# Patient Record
Sex: FEMALE | Race: WHITE | Employment: OTHER | URBAN - METROPOLITAN AREA
[De-identification: names, ages, dates, MRNs, and addresses within clinical notes are randomized per-mention and may not be internally consistent; named-entity substitution may affect disease eponyms.]

---

## 2024-11-05 ENCOUNTER — APPOINTMENT (OUTPATIENT)
Dept: CT IMAGING | Age: 74
DRG: 072 | End: 2024-11-05
Payer: MEDICARE

## 2024-11-05 ENCOUNTER — HOSPITAL ENCOUNTER (INPATIENT)
Age: 74
LOS: 2 days | Discharge: HOME | DRG: 072 | End: 2024-11-09
Attending: EMERGENCY MEDICINE | Admitting: INTERNAL MEDICINE
Payer: MEDICARE

## 2024-11-05 ENCOUNTER — APPOINTMENT (OUTPATIENT)
Dept: ULTRASOUND IMAGING | Age: 74
DRG: 072 | End: 2024-11-05
Payer: MEDICARE

## 2024-11-05 ENCOUNTER — APPOINTMENT (OUTPATIENT)
Dept: GENERAL RADIOLOGY | Age: 74
DRG: 072 | End: 2024-11-05
Payer: MEDICARE

## 2024-11-05 DIAGNOSIS — N30.00 ACUTE CYSTITIS WITHOUT HEMATURIA: ICD-10-CM

## 2024-11-05 DIAGNOSIS — R60.0 LOCALIZED EDEMA: ICD-10-CM

## 2024-11-05 DIAGNOSIS — Z86.59 H/O PSYCHIATRIC HOSPITALIZATION: ICD-10-CM

## 2024-11-05 DIAGNOSIS — I10 HYPERTENSION, UNSPECIFIED TYPE: Primary | ICD-10-CM

## 2024-11-05 DIAGNOSIS — R60.0 PERIPHERAL EDEMA: ICD-10-CM

## 2024-11-05 LAB
ALBUMIN SERPL-MCNC: 4.2 G/DL (ref 3.5–4.6)
ALP SERPL-CCNC: 93 U/L (ref 40–130)
ALT SERPL-CCNC: 15 U/L (ref 0–33)
AMMONIA PLAS-SCNC: 21 UMOL/L (ref 11–51)
AMPHET UR QL SCN: NORMAL
ANION GAP SERPL CALCULATED.3IONS-SCNC: 12 MEQ/L (ref 9–15)
APAP SERPL-MCNC: <5 UG/ML (ref 10–30)
APTT PPP: 24.9 SEC (ref 24.4–36.8)
AST SERPL-CCNC: 39 U/L (ref 0–35)
BACTERIA URNS QL MICRO: NEGATIVE /HPF
BARBITURATES UR QL SCN: NORMAL
BASOPHILS # BLD: 0 K/UL (ref 0–0.2)
BASOPHILS NFR BLD: 0.4 %
BENZODIAZ UR QL SCN: NORMAL
BILIRUB SERPL-MCNC: 0.7 MG/DL (ref 0.2–0.7)
BILIRUB UR QL STRIP: NEGATIVE
BNP BLD-MCNC: 110 PG/ML
BUN SERPL-MCNC: 33 MG/DL (ref 8–23)
CALCIUM SERPL-MCNC: 9.5 MG/DL (ref 8.5–9.9)
CANNABINOIDS UR QL SCN: NORMAL
CHLORIDE SERPL-SCNC: 100 MEQ/L (ref 95–107)
CHOLEST SERPL-MCNC: 184 MG/DL (ref 0–199)
CK SERPL-CCNC: 798 U/L (ref 0–170)
CLARITY UR: CLEAR
CO2 SERPL-SCNC: 27 MEQ/L (ref 20–31)
COCAINE UR QL SCN: NORMAL
COLOR UR: YELLOW
CREAT SERPL-MCNC: 0.72 MG/DL (ref 0.5–0.9)
DRUG SCREEN COMMENT UR-IMP: NORMAL
EOSINOPHIL # BLD: 0.1 K/UL (ref 0–0.7)
EOSINOPHIL NFR BLD: 0.6 %
EPI CELLS #/AREA URNS AUTO: ABNORMAL /HPF (ref 0–5)
ERYTHROCYTE [DISTWIDTH] IN BLOOD BY AUTOMATED COUNT: 12.8 % (ref 11.5–14.5)
ETHANOL PERCENT: NORMAL G/DL
ETHANOLAMINE SERPL-MCNC: <10 MG/DL (ref 0–0.08)
FENTANYL SCREEN, URINE: NORMAL
GLOBULIN SER CALC-MCNC: 3.2 G/DL (ref 2.3–3.5)
GLUCOSE SERPL-MCNC: 158 MG/DL (ref 70–99)
GLUCOSE UR STRIP-MCNC: NEGATIVE MG/DL
HCT VFR BLD AUTO: 45.8 % (ref 37–47)
HDLC SERPL-MCNC: 74 MG/DL (ref 40–59)
HGB BLD-MCNC: 15.4 G/DL (ref 12–16)
HGB UR QL STRIP: ABNORMAL
HYALINE CASTS #/AREA URNS AUTO: ABNORMAL /HPF (ref 0–5)
INR PPP: 1.1
KETONES UR STRIP-MCNC: ABNORMAL MG/DL
LDLC SERPL CALC-MCNC: 95 MG/DL (ref 0–129)
LEUKOCYTE ESTERASE UR QL STRIP: ABNORMAL
LYMPHOCYTES # BLD: 1.2 K/UL (ref 1–4.8)
LYMPHOCYTES NFR BLD: 14.5 %
MCH RBC QN AUTO: 33.3 PG (ref 27–31.3)
MCHC RBC AUTO-ENTMCNC: 33.6 % (ref 33–37)
MCV RBC AUTO: 98.9 FL (ref 79.4–94.8)
METHADONE UR QL SCN: NORMAL
MONOCYTES # BLD: 0.8 K/UL (ref 0.2–0.8)
MONOCYTES NFR BLD: 9.5 %
NEUTROPHILS # BLD: 6.3 K/UL (ref 1.4–6.5)
NEUTS SEG NFR BLD: 74.8 %
NITRITE UR QL STRIP: NEGATIVE
OPIATES UR QL SCN: NORMAL
OXYCODONE UR QL SCN: NORMAL
PCP UR QL SCN: NORMAL
PH UR STRIP: 5.5 [PH] (ref 5–9)
PLATELET # BLD AUTO: 266 K/UL (ref 130–400)
POTASSIUM SERPL-SCNC: 4.1 MEQ/L (ref 3.4–4.9)
PROPOXYPH UR QL SCN: NORMAL
PROT SERPL-MCNC: 7.4 G/DL (ref 6.3–8)
PROT UR STRIP-MCNC: ABNORMAL MG/DL
PROTHROMBIN TIME: 14.2 SEC (ref 12.3–14.9)
RBC # BLD AUTO: 4.63 M/UL (ref 4.2–5.4)
RBC #/AREA URNS AUTO: ABNORMAL /HPF (ref 0–5)
SALICYLATES SERPL-MCNC: <0.3 MG/DL (ref 15–30)
SARS-COV-2 RDRP RESP QL NAA+PROBE: NOT DETECTED
SODIUM SERPL-SCNC: 139 MEQ/L (ref 135–144)
SP GR UR STRIP: 1.03 (ref 1–1.03)
TRIGL SERPL-MCNC: 77 MG/DL (ref 0–150)
TSH REFLEX: 2.29 UIU/ML (ref 0.44–3.86)
URINE REFLEX TO CULTURE: YES
UROBILINOGEN UR STRIP-ACNC: 0.2 E.U./DL
WBC # BLD AUTO: 8.4 K/UL (ref 4.8–10.8)
WBC #/AREA URNS AUTO: >100 /HPF (ref 0–5)

## 2024-11-05 PROCEDURE — 85610 PROTHROMBIN TIME: CPT

## 2024-11-05 PROCEDURE — 80179 DRUG ASSAY SALICYLATE: CPT

## 2024-11-05 PROCEDURE — 80061 LIPID PANEL: CPT

## 2024-11-05 PROCEDURE — 82140 ASSAY OF AMMONIA: CPT

## 2024-11-05 PROCEDURE — 81001 URINALYSIS AUTO W/SCOPE: CPT

## 2024-11-05 PROCEDURE — 84443 ASSAY THYROID STIM HORMONE: CPT

## 2024-11-05 PROCEDURE — 71045 X-RAY EXAM CHEST 1 VIEW: CPT

## 2024-11-05 PROCEDURE — 96375 TX/PRO/DX INJ NEW DRUG ADDON: CPT

## 2024-11-05 PROCEDURE — 87086 URINE CULTURE/COLONY COUNT: CPT

## 2024-11-05 PROCEDURE — 85730 THROMBOPLASTIN TIME PARTIAL: CPT

## 2024-11-05 PROCEDURE — 96376 TX/PRO/DX INJ SAME DRUG ADON: CPT

## 2024-11-05 PROCEDURE — 82077 ASSAY SPEC XCP UR&BREATH IA: CPT

## 2024-11-05 PROCEDURE — 85025 COMPLETE CBC W/AUTO DIFF WBC: CPT

## 2024-11-05 PROCEDURE — 99285 EMERGENCY DEPT VISIT HI MDM: CPT

## 2024-11-05 PROCEDURE — 82550 ASSAY OF CK (CPK): CPT

## 2024-11-05 PROCEDURE — 87635 SARS-COV-2 COVID-19 AMP PRB: CPT

## 2024-11-05 PROCEDURE — 80053 COMPREHEN METABOLIC PANEL: CPT

## 2024-11-05 PROCEDURE — 93005 ELECTROCARDIOGRAM TRACING: CPT | Performed by: EMERGENCY MEDICINE

## 2024-11-05 PROCEDURE — 93970 EXTREMITY STUDY: CPT

## 2024-11-05 PROCEDURE — 83880 ASSAY OF NATRIURETIC PEPTIDE: CPT

## 2024-11-05 PROCEDURE — 2580000003 HC RX 258: Performed by: EMERGENCY MEDICINE

## 2024-11-05 PROCEDURE — 6360000002 HC RX W HCPCS: Performed by: EMERGENCY MEDICINE

## 2024-11-05 PROCEDURE — 96365 THER/PROPH/DIAG IV INF INIT: CPT

## 2024-11-05 PROCEDURE — 70450 CT HEAD/BRAIN W/O DYE: CPT

## 2024-11-05 PROCEDURE — 80143 DRUG ASSAY ACETAMINOPHEN: CPT

## 2024-11-05 PROCEDURE — 80307 DRUG TEST PRSMV CHEM ANLYZR: CPT

## 2024-11-05 RX ORDER — LABETALOL HYDROCHLORIDE 5 MG/ML
10 INJECTION, SOLUTION INTRAVENOUS ONCE
Status: COMPLETED | OUTPATIENT
Start: 2024-11-05 | End: 2024-11-05

## 2024-11-05 RX ORDER — CEPHALEXIN 500 MG/1
500 CAPSULE ORAL EVERY 8 HOURS SCHEDULED
Status: DISCONTINUED | OUTPATIENT
Start: 2024-11-06 | End: 2024-11-06 | Stop reason: CLARIF

## 2024-11-05 RX ORDER — LABETALOL HYDROCHLORIDE 5 MG/ML
20 INJECTION, SOLUTION INTRAVENOUS ONCE
Status: COMPLETED | OUTPATIENT
Start: 2024-11-05 | End: 2024-11-05

## 2024-11-05 RX ADMIN — LABETALOL HYDROCHLORIDE 20 MG: 5 INJECTION, SOLUTION INTRAVENOUS at 23:23

## 2024-11-05 RX ADMIN — CEFTRIAXONE SODIUM 1000 MG: 1 INJECTION, POWDER, FOR SOLUTION INTRAMUSCULAR; INTRAVENOUS at 23:22

## 2024-11-05 RX ADMIN — LABETALOL HYDROCHLORIDE 10 MG: 5 INJECTION, SOLUTION INTRAVENOUS at 22:35

## 2024-11-05 ASSESSMENT — LIFESTYLE VARIABLES
HOW MANY STANDARD DRINKS CONTAINING ALCOHOL DO YOU HAVE ON A TYPICAL DAY: PATIENT DOES NOT DRINK
HOW OFTEN DO YOU HAVE A DRINK CONTAINING ALCOHOL: NEVER

## 2024-11-05 ASSESSMENT — ENCOUNTER SYMPTOMS
COUGH: 0
ABDOMINAL PAIN: 0
SHORTNESS OF BREATH: 0
VOMITING: 0

## 2024-11-05 ASSESSMENT — PAIN - FUNCTIONAL ASSESSMENT: PAIN_FUNCTIONAL_ASSESSMENT: NONE - DENIES PAIN

## 2024-11-06 PROBLEM — R41.82 AMS (ALTERED MENTAL STATUS): Status: ACTIVE | Noted: 2024-11-06

## 2024-11-06 PROCEDURE — G0378 HOSPITAL OBSERVATION PER HR: HCPCS

## 2024-11-06 PROCEDURE — 6360000002 HC RX W HCPCS: Performed by: EMERGENCY MEDICINE

## 2024-11-06 PROCEDURE — 96372 THER/PROPH/DIAG INJ SC/IM: CPT

## 2024-11-06 PROCEDURE — 90792 PSYCH DIAG EVAL W/MED SRVCS: CPT | Performed by: PSYCHIATRY & NEUROLOGY

## 2024-11-06 PROCEDURE — 6360000002 HC RX W HCPCS: Performed by: INTERNAL MEDICINE

## 2024-11-06 PROCEDURE — 2580000003 HC RX 258: Performed by: INTERNAL MEDICINE

## 2024-11-06 PROCEDURE — 6370000000 HC RX 637 (ALT 250 FOR IP): Performed by: PSYCHIATRY & NEUROLOGY

## 2024-11-06 PROCEDURE — 96375 TX/PRO/DX INJ NEW DRUG ADDON: CPT

## 2024-11-06 PROCEDURE — 96376 TX/PRO/DX INJ SAME DRUG ADON: CPT

## 2024-11-06 PROCEDURE — 6370000000 HC RX 637 (ALT 250 FOR IP): Performed by: EMERGENCY MEDICINE

## 2024-11-06 RX ORDER — ONDANSETRON 2 MG/ML
4 INJECTION INTRAMUSCULAR; INTRAVENOUS EVERY 6 HOURS PRN
Status: DISCONTINUED | OUTPATIENT
Start: 2024-11-06 | End: 2024-11-09 | Stop reason: HOSPADM

## 2024-11-06 RX ORDER — RISPERIDONE 1 MG/1
TABLET ORAL
Status: ON HOLD | COMMUNITY
End: 2024-11-08 | Stop reason: HOSPADM

## 2024-11-06 RX ORDER — CEPHALEXIN 500 MG/1
500 CAPSULE ORAL 3 TIMES DAILY
Qty: 21 CAPSULE | Refills: 0 | Status: SHIPPED | OUTPATIENT
Start: 2024-11-06 | End: 2024-11-06

## 2024-11-06 RX ORDER — SODIUM CHLORIDE 0.9 % (FLUSH) 0.9 %
5-40 SYRINGE (ML) INJECTION EVERY 12 HOURS SCHEDULED
Status: DISCONTINUED | OUTPATIENT
Start: 2024-11-06 | End: 2024-11-09 | Stop reason: HOSPADM

## 2024-11-06 RX ORDER — ENOXAPARIN SODIUM 100 MG/ML
40 INJECTION SUBCUTANEOUS DAILY
Status: DISCONTINUED | OUTPATIENT
Start: 2024-11-06 | End: 2024-11-09 | Stop reason: HOSPADM

## 2024-11-06 RX ORDER — MAGNESIUM SULFATE IN WATER 40 MG/ML
2000 INJECTION, SOLUTION INTRAVENOUS PRN
Status: DISCONTINUED | OUTPATIENT
Start: 2024-11-06 | End: 2024-11-09 | Stop reason: HOSPADM

## 2024-11-06 RX ORDER — SODIUM CHLORIDE 9 MG/ML
INJECTION, SOLUTION INTRAVENOUS PRN
Status: DISCONTINUED | OUTPATIENT
Start: 2024-11-06 | End: 2024-11-09 | Stop reason: HOSPADM

## 2024-11-06 RX ORDER — POTASSIUM CHLORIDE 1500 MG/1
40 TABLET, EXTENDED RELEASE ORAL PRN
Status: DISCONTINUED | OUTPATIENT
Start: 2024-11-06 | End: 2024-11-09 | Stop reason: HOSPADM

## 2024-11-06 RX ORDER — CEPHALEXIN 500 MG/1
500 CAPSULE ORAL 3 TIMES DAILY
Qty: 21 CAPSULE | Refills: 0 | Status: SHIPPED | OUTPATIENT
Start: 2024-11-06 | End: 2024-11-08 | Stop reason: HOSPADM

## 2024-11-06 RX ORDER — POLYETHYLENE GLYCOL 3350 17 G/17G
17 POWDER, FOR SOLUTION ORAL DAILY PRN
Status: DISCONTINUED | OUTPATIENT
Start: 2024-11-06 | End: 2024-11-09 | Stop reason: HOSPADM

## 2024-11-06 RX ORDER — SODIUM CHLORIDE 0.9 % (FLUSH) 0.9 %
5-40 SYRINGE (ML) INJECTION PRN
Status: DISCONTINUED | OUTPATIENT
Start: 2024-11-06 | End: 2024-11-09 | Stop reason: HOSPADM

## 2024-11-06 RX ORDER — ONDANSETRON 4 MG/1
4 TABLET, ORALLY DISINTEGRATING ORAL EVERY 8 HOURS PRN
Status: DISCONTINUED | OUTPATIENT
Start: 2024-11-06 | End: 2024-11-09 | Stop reason: HOSPADM

## 2024-11-06 RX ORDER — POTASSIUM CHLORIDE 7.45 MG/ML
10 INJECTION INTRAVENOUS PRN
Status: DISCONTINUED | OUTPATIENT
Start: 2024-11-06 | End: 2024-11-09 | Stop reason: HOSPADM

## 2024-11-06 RX ORDER — ACETAMINOPHEN 325 MG/1
650 TABLET ORAL EVERY 6 HOURS PRN
Status: DISCONTINUED | OUTPATIENT
Start: 2024-11-06 | End: 2024-11-09 | Stop reason: HOSPADM

## 2024-11-06 RX ORDER — ACETAMINOPHEN 650 MG/1
650 SUPPOSITORY RECTAL EVERY 6 HOURS PRN
Status: DISCONTINUED | OUTPATIENT
Start: 2024-11-06 | End: 2024-11-09 | Stop reason: HOSPADM

## 2024-11-06 RX ORDER — HYDRALAZINE HYDROCHLORIDE 20 MG/ML
5 INJECTION INTRAMUSCULAR; INTRAVENOUS ONCE
Status: COMPLETED | OUTPATIENT
Start: 2024-11-06 | End: 2024-11-06

## 2024-11-06 RX ORDER — ARIPIPRAZOLE 2 MG/1
2 TABLET ORAL DAILY
Status: DISCONTINUED | OUTPATIENT
Start: 2024-11-06 | End: 2024-11-07

## 2024-11-06 RX ADMIN — ARIPIPRAZOLE 2 MG: 2 TABLET ORAL at 21:53

## 2024-11-06 RX ADMIN — CEPHALEXIN 500 MG: 500 CAPSULE ORAL at 06:54

## 2024-11-06 RX ADMIN — ENOXAPARIN SODIUM 40 MG: 100 INJECTION SUBCUTANEOUS at 10:50

## 2024-11-06 RX ADMIN — HYDRALAZINE HYDROCHLORIDE 5 MG: 20 INJECTION INTRAMUSCULAR; INTRAVENOUS at 00:35

## 2024-11-06 RX ADMIN — SODIUM CHLORIDE, PRESERVATIVE FREE 10 ML: 5 INJECTION INTRAVENOUS at 12:06

## 2024-11-06 RX ADMIN — HYDRALAZINE HYDROCHLORIDE 5 MG: 20 INJECTION INTRAMUSCULAR; INTRAVENOUS at 00:56

## 2024-11-06 NOTE — ED PROVIDER NOTES
[facility] ED  EMERGENCY DEPARTMENT ENCOUNTER      Pt Name: Penny Katz  MRN: [id number]  Birthdate 1950  Date of evaluation: 11/5/2024  Provider: Brian Rader MD  7:51 PM EST    CHIEF COMPLAINT       Chief Complaint   Patient presents with    Leg Swelling     Patient has extreme swelling in BLE. Patient also CO increased confusion. Denies pain/SOB         HISTORY OF PRESENT ILLNESS   (Location/Symptom, Timing/Onset, Context/Setting, Quality, Duration, Modifying Factors, Severity)  Note limiting factors.   Penny Katz is a 74 y.o. female who presents to the emergency department via private vehicle.  Initial history comes from the patient's son at bedside.  He states that years ago the patient took herself off of her blood pressure medication, her water pill and her psychiatric medication.  That 1 month ago she began to have lower extremity swelling.  That for 3 days she has seemed confused.  He says that right now she is at baseline.  But at home she says where things such as \" leave the door open, leave the door open, leave the door open\" and will not be able to explain to him why she wants the door open.  That she will say that her son is dead and believe that he is dead even though he is standing in front of her.  She is awake and alert, denies fever, headache or double vision, neck or jaw pain, chest pain or difficulty breathing, vomiting, abdominal pain, diarrhea, focal numbness or weakness.    HPI  Chart review notes history of prescription for venlafaxine/Risperdal secondary to depression, iron deficiency anemia, chronic low back pain  Nursing Notes were reviewed.    REVIEW OF SYSTEMS    (2-9 systems for level 4, 10 or more for level 5)     Review of Systems   Constitutional:  Negative for fever.   Eyes:  Negative for visual disturbance.   Respiratory:  Negative for cough and shortness of breath.    Cardiovascular:  Positive for leg swelling.   Gastrointestinal:  Negative for abdominal  components:    WBC, UA >100 (*)     RBC, UA 6-10 (*)     All other components within normal limits   COVID-19, RAPID   CULTURE, URINE   BRAIN NATRIURETIC PEPTIDE   APTT   PROTIME-INR   AMMONIA   ETHANOL   URINE DRUG SCREEN   TSH WITH REFLEX   HEMOGLOBIN A1C       All other labs were within normal range or not returned as of this dictation.    EMERGENCY DEPARTMENT COURSE and DIFFERENTIAL DIAGNOSIS/MDM:   Vitals:    Vitals:    11/06/24 0030 11/06/24 0045 11/06/24 0103 11/06/24 0116   BP: (!) 182/81 (!) 164/79 (!) 152/74 (!) 151/69   Pulse: 71 77 82    Resp: 21 19 24    Temp:       TempSrc:       SpO2: 99% 97% 96%    Weight:       Height:           Possible uti, but no bacteruria-I will treat anyway  No leukocytosis or significant electrolyte derangement  Medical Decision Making  Amount and/or Complexity of Data Reviewed  Labs: ordered.  Radiology: ordered.  ECG/medicine tests: ordered.    Risk  Prescription drug management.    Patient presents emergency department for evaluation of lower extremity edema.  Her son has concerns about things that she says at home.  Probable psychiatric etiology. Prev on risperdal but noncompliant. History and physical not highly suspicious of CVA, meningitis/cephalitis.  Afebrile.  Her plain film and BNP is not consistent with CHF.  Probable peripheral edema, venous insufficiency.  No ICH, DVT ruled out  Given labetalol  Her son states that she was talking about EMS coming into the house (they did not) something about the garage (they do not have one), I think she would benefit from psychiatric care.  She does have a psychiatric history.   She requires psych assessment. Will start keflex as well  Psychiatry believes she has delirium and requires medical admission and they will consult, they do not feel comfortable admitting her primarily  Dr mtz accepts      CONSULTS:  None    PROCEDURES:  Unless otherwise noted below, none     Procedures      FINAL IMPRESSION      1. Hypertension,

## 2024-11-06 NOTE — PROGRESS NOTES
Spiritual Health History and Assessment/Progress Note  [facility]    Interdisciplinary rounds,  ,  ,      Name: Penny Katz MRN: [id number]    Age: 74 y.o.     Sex: female   Language: English   Baptist: Amish   AMS (altered mental status)     Date: 11/6/2024            Total Time Calculated: 15 min              Spiritual Assessment began in [facility] 2W ORTHO TELE        Referral/Consult From: Rounding   Encounter Overview/Reason: Interdisciplinary rounds  Service Provided For: Patient    Patient upright alert in  bed. Patient able to follow and have conversation but has clear disconnect at times. Patient expressed she lives with child and is Amish. Patient aware of swelling in legs and said she used to take a water pill. Patient states she would like to go home.      Jordana, Belief, Meaning:   Patient has beliefs or practices that help with coping during difficult times  Family/Friends No family/friends present      Importance and Influence:  Patient has spiritual/personal beliefs that influence decisions regarding their health  Family/Friends No family/friends present    Community:  Patient feels well-supported. Support system includes: Children  Family/Friends No family/friends present    Assessment and Plan of Care:     Patient Interventions include: Facilitated expression of thoughts and feelings and Affirmed coping skills/support systems  Family/Friends Interventions include: No family/friends present    Patient Plan of Care: Spiritual Care available upon further referral  Family/Friends Plan of Care: No family/friends present    Electronically signed by Nhan Desailain Intern on 11/6/2024 at 4:16 PM

## 2024-11-06 NOTE — PROGRESS NOTES
Problem: Falls - Risk of  Goal: *Absence of Falls  Description: Document Kyra Citlalys Fall Risk and appropriate interventions in the flowsheet.   Outcome: Progressing Towards Goal  Note: Fall Risk Interventions:  Mobility Interventions: OT consult for ADLs, Patient to call before getting OOB, PT Consult for mobility concerns, PT Consult for assist device competence, Strengthening exercises (ROM-active/passive), Utilize walker, cane, or other assistive device         Medication Interventions: Assess postural VS orthostatic hypotension, Patient to call before getting OOB, Teach patient to arise slowly    Elimination Interventions: Call light in reach, Patient to call for help with toileting needs, Toileting schedule/hourly rounds Report received from OSMANY Estrada RN in [facility] at this time

## 2024-11-06 NOTE — ED NOTES
Provisional Diagnosis:   Psych Evaluation, confusion    Psychosocial and Contextual Factors:      This 74 year old  female presents to the ED for leg swelling and confusion. She lives with her son and 3 pet cats. She has 2 other adult sons. Per ED patient's son reports she took herself off her blood pressure medication, water pill and psych medication. She has no psychiatrist or therapist. She reports occasional alcohol use. She denies any substance abuse. Denies any violence or legal issues. The family has transportation.       C-SSRS Summary:    1) Within the past month, have you wished you were dead or wished you could go to sleep and not wake up? : No  2) Have you actually had any thoughts of killing yourself? : No  6) Have you ever done anything, started to do anything, or prepared to do anything to end your life?: No    Patient: Calm and cooperative.    Family:  None at bedside.    Agency: None    Substance Abuse:   Pt denies substance abuse.  Pt reports occasional alcohol use. Tox screens negative.    Present Suicidal Behavior:      Verbal: Denies    Attempt:Denies    Past Suicidal Behavior:     Verbal:Denies    Attempt:Denies    Self-Injurious/Self-Mutilation:Denies    Violence Current or Past: Denies      Trauma Identified:    Physical abuse: Denies  Verbal abuse: Denies  Emotional abuse: Denies  Financial abuse: Denies  Sexual abuse: Denies    Protective Factors:    Supportive son.  Housing    Risk Factors:    Non compliance with medications  No psych services.    Clinical Summary:    74 year old female presents to ED with BLE swelling and increased confusion. Pt pleasant and cooperative. Patient tearful reports recent losses of family and friends. She reports not taking prescribed medications. Per ED note; while at home pt will say her son is dead and believe he is dead even though he is standing in front of her. Pt denies SI, HI, and AVH. Patient reports feeling sad. She endorses adequate appetite

## 2024-11-06 NOTE — ACP (ADVANCE CARE PLANNING)
Advance Care Planning   Healthcare Decision Maker:    Primary Decision Maker: Mac Ruelas - Child - [phone]    Secondary Decision Maker: Joanne Portillo - Daughter-in-Law - [phone]    Click here to complete Healthcare Decision Makers including selection of the Healthcare Decision Maker Relationship (ie \"Primary\").

## 2024-11-06 NOTE — H&P
History and Physical    Admit Date: 11/5/2024  PCP: No primary care provider on file.    CHIEF COMPLAINT:    Chief Complaint   Patient presents with    Leg Swelling     Patient has extreme swelling in BLE. Patient also CO increased confusion. Denies pain/SOB        HISTORY OF PRESENT ILLNESS:    The patient is a 74 y.o. female with a past medical history of hypertension, depression, obesity who presented with lower extremity the edema and confusion.  Patient was increasingly more confused yesterday.  Patient is a poor historian due to some confusion however she is oriented x 3.  Apparently per chart review, patient took herself off her medication including blood pressure medications and diuretics.  She denies history of CHF but has lower extremity edema.  She reports she had it for very long period of time.  She does not remember how long but over several months.  No chest pain or chest evidence.  No fever or chills.  No orthopnea.    Past Medical History:    Obesity  Hypertension  Depression  Psychiatric problems    Past Surgical History:    Noncontributory, reviewed    Social History:   Social History     Socioeconomic History    Marital status:      Spouse name: Not on file    Number of children: Not on file    Years of education: Not on file    Highest education level: Not on file   Occupational History    Not on file   Tobacco Use    Smoking status: Never    Smokeless tobacco: Not on file   Substance and Sexual Activity    Alcohol use: Not on file    Drug use: Not on file    Sexual activity: Not on file   Other Topics Concern    Not on file   Social History Narrative    Not on file     Social Determinants of Health     Financial Resource Strain: Not on file   Food Insecurity: Food Insecurity Present (11/6/2024)    Hunger Vital Sign     Worried About Running Out of Food in the Last Year: Sometimes true     Ran Out of Food in the Last Year: Never true   Transportation Needs: No Transportation Needs  (2024)    PRAPARE - Transportation     Lack of Transportation (Medical): No     Lack of Transportation (Non-Medical): No   Physical Activity: Not on file   Stress: Not on file   Social Connections: Not on file   Intimate Partner Violence: Not on file   Housing Stability: Low Risk  (2024)    Housing Stability Vital Sign     Unable to Pay for Housing in the Last Year: No     Number of Times Moved in the Last Year: 0     Homeless in the Last Year: No       Family History:   No family history on file.    Medications Prior to Admission:    Prior to Admission medications    Medication Sig Start Date End Date Taking? Authorizing Provider   cephALEXin (KEFLEX) 500 MG capsule Take 1 capsule by mouth 3 times daily for 7 days 24 Yes Brian Rader MD   risperiDONE (RISPERDAL) 1 MG tablet    Yes Provider, MD Michael       Allergies:  Celebrex [celecoxib] and Mobic [meloxicam]    REVIEW OF SYSTEMS:  All systems reviewed and negative except for what is in HPI      PHYSICAL EXAM:  Vitals:  BP (!) 155/73   Pulse 91   Temp 98.2 °F (36.8 °C) (Oral)   Resp 16   Ht 1.575 m (5' 2\")   Wt 72.6 kg (160 lb)   SpO2 98%   BMI 29.26 kg/m²     Pulse Ox: SpO2  Av.3 %  Min: 96 %  Max: 99 %  Supplemental O2:      CONSTITUTIONAL:  awake, alert, cooperative, no apparent distress, and appears stated age  HEENT: Normocephalic, PERRLA  NECK: no JVD, no LAD  HEART: RRR, no murmurs, gallops, or rubs  LUNGS: clear to auscultation bilaterally, no wheezes, crackles, or rhonchi.  ABDOMEN: soft/NT/ND, positive BS  MUSCULOSKELETAL: Lateral lower extremity edema.  Pulses intact  SKIN: intact without rash or jaundice  NEURO:  CN intact and no focal deficits.  Oriented x 3 but has some confusion.    DATA:  Recent Results (from the past 24 hour(s))   EKG 12 Lead    Collection Time: 24  8:02 PM   Result Value Ref Range    Ventricular Rate 91 BPM    Atrial Rate 91 BPM    P-R Interval 164 ms    QRS Duration 78 ms    Q-T  ng/mL    Propoxyphene Scrn, Ur Neg Negative <300 ng/mL    Oxycodone Urine Neg Negative <100 ng/mL    FENTANYL SCREEN, URINE Neg Negative < 50 ng/mL    Drug Screen Comment: see below    Microscopic Urinalysis    Collection Time: 11/05/24 10:56 PM   Result Value Ref Range    Bacteria, UA Negative Negative /HPF    Hyaline Casts, UA 5-10 0 - 5 /HPF    WBC, UA >100 (H) 0 - 5 /HPF    RBC, UA 6-10 (A) 0 - 5 /HPF    Epithelial Cells, UA 3-5 0 - 5 /HPF           ASSESSMENT AND PLAN:    Acute encephalopathy  Possible UTI-less likely clinically but UA is positive  Bilateral lower extremity edema-DVT ruled out, rule out CHF  Hypertension  Noncompliance with medications  Depression    Plan  Admit to medical floor  Start IV Rocephin, follow-up urine culture  Obtain echocardiogram  Home medication ordered and reviewed  Psych consult  DVT prophylaxis  Regular diet  Discussed plan with patient  Full code           Code status: Full Code    Electronically signed by Eileen Nix DO on 11/6/24 at 11:43 AM EST

## 2024-11-06 NOTE — CONSULTS
[facility] Department of Psychiatry  Behavioral Health Consult    REASON FOR CONSULT: AMS    CONSULTING PHYSICIAN: Dr Nix    History obtained from: patient    HISTORY OF PRESENT ILLNESS:      The patient is a 74 y.o. female with significant past psychiatric history of schizophrenia who presents with AMS    Patient lives with her son and she is currently not on any medication.  She was taking medicine for schizophrenia many years ago.  She also quit taking her blood pressure medication.  Patient is alert and oriented x 2.  She was apparently telling her son that he is dead although was standing in front of her.  Patient reports that she does have thoughts like that on and off.  She currently believes that her son is alive.  She has paranoid thinking but denies any audiovisual hallucinations.  Patient denies any depressive symptoms.  She I will like to denies any suicidal thoughts   Patient appeared tired and lethargic during the interview.  She has been cooperative with all the treatment and care.      The patient is not currently receiving care for the above psychiatric illness.      Psychiatric Review of Systems       Depression: denies     Aleisha or Hypomania:  no     Panic Attacks:  yes      Phobias:  no     Obsessions and Compulsions:  no     PTSD : no     Hallucinations:  no     Delusions:  yes       Substance Abuse History:  ETOH: no  Marijuana: no  Opiates: no  Other Drugs: no      Past Psychiatric History:  Prior Diagnosis: Schizophrenia  Psychiatrist: No  Therapist:no  Hospitalization: no  Hx of Suicidal Attempts: no  Hx of violence:  no  ECT: no    Past Medical History:    No past medical history on file.    Past Surgical History:    No past surgical history on file.    Medications Prior to Admission:   Medications Prior to Admission: risperiDONE (RISPERDAL) 1 MG tablet,   cephALEXin (KEFLEX) 500 MG capsule, Take 1 capsule by mouth 2 times daily (Patient not taking: Reported on  Pregnancy: []Yes   []No   LMP:   Musculoskeletal:  []Back pain  []Neck pain  []Recent Injury   Skin:  []Rash  [] Itching  [] Other:  Neurologic:  [] Headache  [] Focal weakness  [] Sensory changes []Other:  Endocrine:  [] Polyuria  [] Polydipsia  [] Hair Loss  [] Other:  Lymphatic:   [] Swollen glands   Psychiatric:  As per HPI      All other systems negative except as marked or mentioned/indicated in the HPI.   .     PHYSICAL EXAM:  Vitals:  BP (!) 155/73   Pulse 91   Temp 98.2 °F (36.8 °C) (Oral)   Resp 16   Ht 1.575 m (5' 2\")   Wt 72.6 kg (160 lb)   SpO2 98%   BMI 29.26 kg/m²      Neuro Exam:   Muscle Strength & Tone: full ROM  Gait: normal gait   Involuntary Movements: No    Mental Status Examination:    Level of consciousness:  within normal limits   Appearance:  ill-appearing  Behavior/Motor:  psychomotor retardation  Attitude toward examiner:  cooperative  Speech:  slow   Mood: dysthymic  Affect:  mood congruent  Thought processes:  slow   Association  Thought content:  Suicidal Ideation:  denies suicidal ideation  Delusions:  paranoid  Perceptual Disturbance:  denies any perceptual disturbance  Cognition:  oriented to person, place   Attention & Concentration distractible  Memory   Mini Mental Status not completed because   Insight poor   Judgement poor   Fund of Knowledge limited      DIAGNOSIS:     Delirium    H/O Schizophrenia      RISK ASSESSMENT:        LABS: REVIEWED TODAY:  Recent Labs     11/05/24 2011   WBC 8.4   HGB 15.4        Recent Labs     11/05/24 2012      K 4.1      CO2 27   BUN 33*   CREATININE 0.72   GLUCOSE 158*     Recent Labs     11/05/24 2012   BILITOT 0.7   ALKPHOS 93   AST 39*   ALT 15     Lab Results   Component Value Date/Time    BARBSCNU Neg 11/05/2024 10:56 PM    LABBENZ Neg 11/05/2024 10:56 PM    LABMETH Neg 11/05/2024 10:56 PM    ETOH <10 11/05/2024 08:36 PM     Lab Results   Component Value Date/Time    TSH 2.290 11/05/2024 08:12 PM     No results  TECHNIQUE: Duplex ultrasound using B-mode/gray scaled imaging and Doppler spectral analysis and color flow was obtained of the deep venous structures of the bilateral extremities. COMPARISON: None. HISTORY: ORDERING SYSTEM PROVIDED HISTORY: Edema right calf TECHNOLOGIST PROVIDED HISTORY: What reading provider will be dictating this exam?->CRC FINDINGS: The visualized veins of the bilateral lower extremities are patent and free of echogenic thrombus. The veins demonstrate good compressibility with normal color flow study and spectral analysis.     No evidence of DVT in either lower extremity.     XR CHEST PORTABLE    Result Date: 11/5/2024  EXAMINATION: ONE XRAY VIEW OF THE CHEST 11/5/2024 8:09 pm COMPARISON: None. HISTORY: ORDERING SYSTEM PROVIDED HISTORY: chf TECHNOLOGIST PROVIDED HISTORY: Reason for exam:->chf What reading provider will be dictating this exam?->CRC FINDINGS: Adequate and symmetric aeration of the lungs. There are no formed consolidations, pleural effusions, or pneumothoraces. Trachea and central mainstem bronchi appear clear.  Atherosclerotic disease in the aortic arch. The remaining cardiomediastinal silhouette and pulmonary vascularity appear within normal limits. Osseous and thoracic soft tissue structures demonstrate no acute findings.     Atherosclerotic disease. No additional evidence of active cardiopulmonary pathology.       EKG: TRACING REVIEWED    RECOMMENDATIONS    Risk Management:  routine:  no special precautions necessary    Medications:  Abilify 2 mg started for psychosis  Pt does not meet criteria for IP psych admit  Can be discharged when medically stable  F/U with [facility] on discharge  Discussed with the treating physician/ team about the patient and treatment plan  Reviewed the chart    Discussed with the patient risk, benefit, alternative and common side effects for the  proposed medication treatment. Patient is consenting to the treatment.    Thanks for the consult. Please call

## 2024-11-06 NOTE — ED NOTES
Patient resting quietly with eyes opened. Respirations are even and unlabored. No distress noted at this time.

## 2024-11-06 NOTE — ED NOTES
Call immediately to report any decreased vision or visual distortion. Patient resting quietly with eyes closed. Respirations are even and unlabored. No distress noted at this time.

## 2024-11-07 ENCOUNTER — APPOINTMENT (OUTPATIENT)
Age: 74
DRG: 072 | End: 2024-11-07
Attending: INTERNAL MEDICINE
Payer: MEDICARE

## 2024-11-07 LAB
ALBUMIN SERPL-MCNC: 3.4 G/DL (ref 3.5–4.6)
ALP SERPL-CCNC: 75 U/L (ref 40–130)
ALT SERPL-CCNC: 10 U/L (ref 0–33)
ANION GAP SERPL CALCULATED.3IONS-SCNC: 9 MEQ/L (ref 9–15)
AST SERPL-CCNC: 21 U/L (ref 0–35)
BACTERIA UR CULT: NORMAL
BASOPHILS # BLD: 0 K/UL (ref 0–0.2)
BASOPHILS NFR BLD: 0.3 %
BILIRUB SERPL-MCNC: 0.6 MG/DL (ref 0.2–0.7)
BNP BLD-MCNC: 89 PG/ML
BUN SERPL-MCNC: 25 MG/DL (ref 8–23)
CALCIUM SERPL-MCNC: 8.6 MG/DL (ref 8.5–9.9)
CHLORIDE SERPL-SCNC: 105 MEQ/L (ref 95–107)
CO2 SERPL-SCNC: 25 MEQ/L (ref 20–31)
CREAT SERPL-MCNC: 0.51 MG/DL (ref 0.5–0.9)
ECHO AO ROOT DIAM: 2.6 CM
ECHO AO ROOT INDEX: 1.49 CM/M2
ECHO AV AREA PEAK VELOCITY: 1.7 CM2
ECHO AV AREA VTI: 1.7 CM2
ECHO AV AREA/BSA PEAK VELOCITY: 1 CM2/M2
ECHO AV AREA/BSA VTI: 1 CM2/M2
ECHO AV MEAN GRADIENT: 6 MMHG
ECHO AV MEAN VELOCITY: 1.1 M/S
ECHO AV PEAK GRADIENT: 12 MMHG
ECHO AV PEAK VELOCITY: 1.8 M/S
ECHO AV VELOCITY RATIO: 0.56
ECHO AV VTI: 32.5 CM
ECHO BSA: 1.78 M2
ECHO EST RA PRESSURE: 3 MMHG
ECHO LA DIAMETER INDEX: 2.01 CM/M2
ECHO LA DIAMETER: 3.5 CM
ECHO LA TO AORTIC ROOT RATIO: 1.35
ECHO LA VOL A-L A2C: 73 ML (ref 22–52)
ECHO LA VOL A-L A4C: 57 ML (ref 22–52)
ECHO LA VOL MOD A2C: 70 ML (ref 22–52)
ECHO LA VOL MOD A4C: 55 ML (ref 22–52)
ECHO LA VOLUME AREA LENGTH: 67 ML
ECHO LA VOLUME INDEX A-L A2C: 42 ML/M2 (ref 16–34)
ECHO LA VOLUME INDEX A-L A4C: 33 ML/M2 (ref 16–34)
ECHO LA VOLUME INDEX AREA LENGTH: 39 ML/M2 (ref 16–34)
ECHO LA VOLUME INDEX MOD A2C: 40 ML/M2 (ref 16–34)
ECHO LA VOLUME INDEX MOD A4C: 32 ML/M2 (ref 16–34)
ECHO LV E' LATERAL VELOCITY: 10.8 CM/S
ECHO LV E' SEPTAL VELOCITY: 6.5 CM/S
ECHO LV EDV A2C: 65 ML
ECHO LV EDV A4C: 79 ML
ECHO LV EDV BP: 76 ML (ref 56–104)
ECHO LV EDV INDEX A4C: 45 ML/M2
ECHO LV EDV INDEX BP: 44 ML/M2
ECHO LV EDV NDEX A2C: 37 ML/M2
ECHO LV EJECTION FRACTION A2C: 68 %
ECHO LV EJECTION FRACTION A4C: 77 %
ECHO LV EJECTION FRACTION BIPLANE: 75 % (ref 55–100)
ECHO LV ESV A2C: 21 ML
ECHO LV ESV A4C: 19 ML
ECHO LV ESV BP: 19 ML (ref 19–49)
ECHO LV ESV INDEX A2C: 12 ML/M2
ECHO LV ESV INDEX A4C: 11 ML/M2
ECHO LV ESV INDEX BP: 11 ML/M2
ECHO LV FRACTIONAL SHORTENING: 29 % (ref 28–44)
ECHO LV INTERNAL DIMENSION DIASTOLE INDEX: 1.95 CM/M2
ECHO LV INTERNAL DIMENSION DIASTOLIC: 3.4 CM (ref 3.9–5.3)
ECHO LV INTERNAL DIMENSION SYSTOLIC INDEX: 1.38 CM/M2
ECHO LV INTERNAL DIMENSION SYSTOLIC: 2.4 CM
ECHO LV IVSD: 1.1 CM (ref 0.6–0.9)
ECHO LV IVSS: 1.4 CM
ECHO LV MASS 2D: 138.8 G (ref 67–162)
ECHO LV MASS INDEX 2D: 79.8 G/M2 (ref 43–95)
ECHO LV POSTERIOR WALL DIASTOLIC: 1.4 CM (ref 0.6–0.9)
ECHO LV POSTERIOR WALL SYSTOLIC: 2.2 CM
ECHO LV RELATIVE WALL THICKNESS RATIO: 0.82
ECHO LVOT AREA: 2.8 CM2
ECHO LVOT AV VTI INDEX: 0.59
ECHO LVOT DIAM: 1.9 CM
ECHO LVOT MEAN GRADIENT: 2 MMHG
ECHO LVOT PEAK GRADIENT: 4 MMHG
ECHO LVOT PEAK VELOCITY: 1 M/S
ECHO LVOT STROKE VOLUME INDEX: 31.3 ML/M2
ECHO LVOT SV: 54.4 ML
ECHO LVOT VTI: 19.2 CM
ECHO PV MAX VELOCITY: 1 M/S
ECHO PV PEAK GRADIENT: 4 MMHG
ECHO RIGHT VENTRICULAR SYSTOLIC PRESSURE (RVSP): 13 MMHG
ECHO RV INTERNAL DIMENSION: 2.7 CM
ECHO RV TAPSE: 2.4 CM (ref 1.7–?)
ECHO TV REGURGITANT MAX VELOCITY: 1.62 M/S
ECHO TV REGURGITANT PEAK GRADIENT: 11 MMHG
EKG ATRIAL RATE: 91 BPM
EKG P-R INTERVAL: 164 MS
EKG Q-T INTERVAL: 354 MS
EKG QRS DURATION: 78 MS
EKG QTC CALCULATION (BAZETT): 435 MS
EKG R AXIS: 5 DEGREES
EKG T AXIS: 23 DEGREES
EKG VENTRICULAR RATE: 91 BPM
EOSINOPHIL # BLD: 0.1 K/UL (ref 0–0.7)
EOSINOPHIL NFR BLD: 1.3 %
ERYTHROCYTE [DISTWIDTH] IN BLOOD BY AUTOMATED COUNT: 13.1 % (ref 11.5–14.5)
GLOBULIN SER CALC-MCNC: 2.6 G/DL (ref 2.3–3.5)
GLUCOSE SERPL-MCNC: 106 MG/DL (ref 70–99)
HCT VFR BLD AUTO: 40.9 % (ref 37–47)
HGB BLD-MCNC: 13.7 G/DL (ref 12–16)
LYMPHOCYTES # BLD: 1.2 K/UL (ref 1–4.8)
LYMPHOCYTES NFR BLD: 19.7 %
MCH RBC QN AUTO: 32.5 PG (ref 27–31.3)
MCHC RBC AUTO-ENTMCNC: 33.5 % (ref 33–37)
MCV RBC AUTO: 97.1 FL (ref 79.4–94.8)
MONOCYTES # BLD: 0.7 K/UL (ref 0.2–0.8)
MONOCYTES NFR BLD: 10.6 %
NEUTROPHILS # BLD: 4.3 K/UL (ref 1.4–6.5)
NEUTS SEG NFR BLD: 67.8 %
PLATELET # BLD AUTO: 225 K/UL (ref 130–400)
POTASSIUM SERPL-SCNC: 4 MEQ/L (ref 3.4–4.9)
PROT SERPL-MCNC: 6 G/DL (ref 6.3–8)
RBC # BLD AUTO: 4.21 M/UL (ref 4.2–5.4)
SODIUM SERPL-SCNC: 139 MEQ/L (ref 135–144)
WBC # BLD AUTO: 6.3 K/UL (ref 4.8–10.8)

## 2024-11-07 PROCEDURE — 6370000000 HC RX 637 (ALT 250 FOR IP): Performed by: INTERNAL MEDICINE

## 2024-11-07 PROCEDURE — 96372 THER/PROPH/DIAG INJ SC/IM: CPT

## 2024-11-07 PROCEDURE — 96376 TX/PRO/DX INJ SAME DRUG ADON: CPT

## 2024-11-07 PROCEDURE — 93010 ELECTROCARDIOGRAM REPORT: CPT | Performed by: INTERNAL MEDICINE

## 2024-11-07 PROCEDURE — 2500000003 HC RX 250 WO HCPCS: Performed by: INTERNAL MEDICINE

## 2024-11-07 PROCEDURE — 6370000000 HC RX 637 (ALT 250 FOR IP): Performed by: PSYCHIATRY & NEUROLOGY

## 2024-11-07 PROCEDURE — 1210000000 HC MED SURG R&B

## 2024-11-07 PROCEDURE — 36415 COLL VENOUS BLD VENIPUNCTURE: CPT

## 2024-11-07 PROCEDURE — 99232 SBSQ HOSP IP/OBS MODERATE 35: CPT | Performed by: PSYCHIATRY & NEUROLOGY

## 2024-11-07 PROCEDURE — 93306 TTE W/DOPPLER COMPLETE: CPT | Performed by: INTERNAL MEDICINE

## 2024-11-07 PROCEDURE — 6360000002 HC RX W HCPCS: Performed by: INTERNAL MEDICINE

## 2024-11-07 PROCEDURE — 2580000003 HC RX 258: Performed by: INTERNAL MEDICINE

## 2024-11-07 PROCEDURE — 80053 COMPREHEN METABOLIC PANEL: CPT

## 2024-11-07 PROCEDURE — 85025 COMPLETE CBC W/AUTO DIFF WBC: CPT

## 2024-11-07 PROCEDURE — 83880 ASSAY OF NATRIURETIC PEPTIDE: CPT

## 2024-11-07 PROCEDURE — 93306 TTE W/DOPPLER COMPLETE: CPT

## 2024-11-07 RX ORDER — ARIPIPRAZOLE 2 MG/1
2 TABLET ORAL
Status: DISCONTINUED | OUTPATIENT
Start: 2024-11-08 | End: 2024-11-09 | Stop reason: HOSPADM

## 2024-11-07 RX ORDER — LOSARTAN POTASSIUM 25 MG/1
50 TABLET ORAL DAILY
Status: DISCONTINUED | OUTPATIENT
Start: 2024-11-07 | End: 2024-11-09 | Stop reason: HOSPADM

## 2024-11-07 RX ADMIN — ARIPIPRAZOLE 2 MG: 2 TABLET ORAL at 08:26

## 2024-11-07 RX ADMIN — LOSARTAN POTASSIUM 50 MG: 25 TABLET, FILM COATED ORAL at 11:16

## 2024-11-07 RX ADMIN — WATER 1000 MG: 1 INJECTION INTRAMUSCULAR; INTRAVENOUS; SUBCUTANEOUS at 08:34

## 2024-11-07 RX ADMIN — SODIUM CHLORIDE, PRESERVATIVE FREE 10 ML: 5 INJECTION INTRAVENOUS at 08:34

## 2024-11-07 RX ADMIN — ENOXAPARIN SODIUM 40 MG: 100 INJECTION SUBCUTANEOUS at 08:26

## 2024-11-07 RX ADMIN — MICONAZOLE NITRATE: 2 POWDER TOPICAL at 21:05

## 2024-11-07 RX ADMIN — SODIUM CHLORIDE, PRESERVATIVE FREE 10 ML: 5 INJECTION INTRAVENOUS at 21:05

## 2024-11-07 RX ADMIN — MICONAZOLE NITRATE: 2 POWDER TOPICAL at 12:04

## 2024-11-07 ASSESSMENT — PAIN SCALES - GENERAL: PAINLEVEL_OUTOF10: 0

## 2024-11-07 NOTE — PROGRESS NOTES
[facility]  BEHAVIORAL HEALTH FOLLOW-UP NOTE       11/7/2024     Patient was seen and examined in person, Chart reviewed   Patient's case discussed with staff/team    Chief Complaint: AMS    Interim History:     Patient is somnolent, but able to be aroused  Unable to obtain any meaningful information from the patient  Patient received Abilify but she was lethargic even before she received Abilify    Appetite:   [] Normal/Unchanged  [] Increased  [] Decreased      Sleep:       [] Normal/Unchanged  [] Fair       [] Poor              Energy:    [] Normal/Unchanged  [] Increased  [] Decreased        SI [] Present  [] Absent    HI  []Present  [] Absent     Aggression:  [] yes  [] no    Patient is [] able  [] unable to CONTRACT FOR SAFETY     PAST MEDICAL/PSYCHIATRIC HISTORY:   No past medical history on file.    FAMILY/SOCIAL HISTORY:  No family history on file.  Social History     Socioeconomic History    Marital status:      Spouse name: Not on file    Number of children: Not on file    Years of education: Not on file    Highest education level: Not on file   Occupational History    Not on file   Tobacco Use    Smoking status: Never    Smokeless tobacco: Not on file   Substance and Sexual Activity    Alcohol use: Not on file    Drug use: Not on file    Sexual activity: Not on file   Other Topics Concern    Not on file   Social History Narrative    Not on file     Social Determinants of Health     Financial Resource Strain: Not on file   Food Insecurity: Food Insecurity Present (11/6/2024)    Hunger Vital Sign     Worried About Running Out of Food in the Last Year: Sometimes true     Ran Out of Food in the Last Year: Never true   Transportation Needs: No Transportation Needs (11/6/2024)    PRAPARE - Transportation     Lack of Transportation (Medical): No     Lack of Transportation (Non-Medical): No   Physical Activity: Not on file   Stress: Not on file   Social Connections: Not on file  polyethylene glycol (GLYCOLAX) packet 17 g, 17 g, Oral, Daily PRN, Eileen Nix R, DO    acetaminophen (TYLENOL) tablet 650 mg, 650 mg, Oral, Q6H PRN **OR** acetaminophen (TYLENOL) suppository 650 mg, 650 mg, Rectal, Q6H PRN, Eileen Nix R, DO    ARIPiprazole (ABILIFY) tablet 2 mg, 2 mg, Oral, Daily, Keisha Shin MD, 2 mg at 11/07/24 0826      Examination:  BP (!) 150/77   Pulse 87   Temp 99.1 °F (37.3 °C) (Axillary)   Resp 16   Ht 1.575 m (5' 2.01\")   Wt 72.6 kg (160 lb)   SpO2 97%   BMI 29.26 kg/m²   Gait - in bed  Medication side effects(SE): no    Mental Status Examination:    Unable to obtain any information from mental status exam    ASSESSMENT:   Patient symptoms are:  [] Well controlled  [] Improving  [] Worsening  [] No change      Diagnosis:   Principal Problem:    AMS (altered mental status)  Resolved Problems:    * No resolved hospital problems. *      LABS:    Recent Labs     11/05/24 2011 11/07/24  0542   WBC 8.4 6.3   HGB 15.4 13.7    225     Recent Labs     11/05/24 2012 11/07/24  0542    139   K 4.1 4.0    105   CO2 27 25   BUN 33* 25*   CREATININE 0.72 0.51   GLUCOSE 158* 106*     Recent Labs     11/05/24 2012 11/07/24  0542   BILITOT 0.7 0.6   ALKPHOS 93 75   AST 39* 21   ALT 15 10     Lab Results   Component Value Date/Time    BARBSCNU Neg 11/05/2024 10:56 PM    LABBENZ Neg 11/05/2024 10:56 PM    LABMETH Neg 11/05/2024 10:56 PM    ETOH <10 11/05/2024 08:36 PM     Lab Results   Component Value Date/Time    TSH 2.290 11/05/2024 08:12 PM     No results found for: \"LITHIUM\"  No results found for: \"VALPROATE\", \"CBMZ\"    RISK ASSESSMENT:     Treatment Plan:  Reviewed current Medications with the patient.   Change Abilify 2 mg to nighttime from tomorrow  Patient does not need sitter and she does not meet criteria for inpatient admission to psychiatry unit/ pink slip  Will follow    Electronically signed by KEISHA SHIN MD on 11/7/2024 at 2:50 PM

## 2024-11-07 NOTE — FLOWSHEET NOTE
Am nursing  assessment completed.    Pt :  resting in bed             Alert and oriented. To self/ place/ confused     Diet: set up. declined  Code Status: fc       Oxygen: room air  Complaints of:  denies                       Pain: denies  IV:  SL            patent/ flushed/ capped, no signs of infiltration noted, dressing clean/dry/intact.  TELE:  n/a               Dressings:                           Precautions: SITTER 1:1             Falls:    75    Aaron: 19  Chart and meds reviewed.           Noted Labs: na 139 k 4.0 bun 25 cr0.51 w 6.3 h 13.7/40.9  Plan for today: ECHO today    Bed wheels locked and in lowest position. Call light and bedside table within reach.   NOTES: Dr Nix by for rounds. Pt set up for breakfast. Declines at this time. Am meds taken without difficulty. Pt drowsy/ withdrawn. Awakens w stimuli, forgetful and confused of place and situation. Electronically signed by Linette Marie RN on 11/7/2024 at 9:25 AM    1100 Dr Menchaca by for rounds. Pt drowsy.  assist to bathroom x 2 assist. Void x1. Urine foul and cloudy/ purulent drainage noted on thighs.  Zinc cream to excoriation on thighs. AVASYS at bedside. Electronically signed by Linette Marie RN on 11/7/2024 at 11:26 AM

## 2024-11-07 NOTE — PROGRESS NOTES
Spoke with patient's son Mac, he states that he will look into other Outpatient services rather then the [place] center.  The son was given the OP coordinator's number  on [address] for a list of providers.

## 2024-11-07 NOTE — PROGRESS NOTES
Internal Medicine   Hospitalist   Progress Note    2024   10:21 AM    Name:  Penny Katz  MRN:    [id number]     IP Day: 0     Admit Date: 2024  7:50 PM  PCP: No primary care provider on file.    Code Status:  Full Code    Assessment and Plan:        Active Problems/ diagnosis:       Acute encephalopathy  Possible UTI-less likely clinically but UA is positive  Bilateral lower extremity edema-DVT ruled out, rule out CHF  Hypertension  Noncompliance with medications  Depression     Plan  Not talking much today. Seen by psych , abilify started   Start losartan. Monitor BP  IV Rocephin, follow-up urine culture  Obtain echocardiogram  Home medication ordered and reviewed  Psych consult  DVT prophylaxis  Regular diet  Discussed plan with patient  Full code    7 pm- 7 am, please contact on call Hospitalist for any needs     Subjective:      no new events. Not talking much today. Seems depressed. No SI/HI/ hallucinations.     Physical Examination:      Vitals:  BP (!) 167/78   Pulse 100   Temp 98.9 °F (37.2 °C) (Oral)   Resp 16   Ht 1.575 m (5' 2\")   Wt 72.6 kg (160 lb)   SpO2 96%   BMI 29.26 kg/m²   Temp (24hrs), Av.9 °F (37.2 °C), Min:98.2 °F (36.8 °C), Max:100.4 °F (38 °C)      General appearance: alert, cooperative and no distress  Mental Status: oriented to person, place and time and normal affect  Lungs: clear to auscultation bilaterally, normal effort  Heart: regular rate and rhythm, no murmur  Abdomen: soft, nontender, nondistended, bowel sounds present, no masses  Extremities: no edema, redness, tenderness in the calves  Skin: no gross lesions, rashes    Data:     Labs:  Recent Labs     24  0542   WBC 8.4 6.3   HGB 15.4 13.7    225     Recent Labs     24  0542    139   K 4.1 4.0    105   CO2 27 25   BUN 33* 25*   CREATININE 0.72 0.51   GLUCOSE 158* 106*     Recent Labs     24  0542   AST 39* 21   ALT 15 10    BILITOT 0.7 0.6   ALKPHOS 93 75       Current Facility-Administered Medications   Medication Dose Route Frequency Provider Last Rate Last Admin    sodium chloride flush 0.9 % injection 5-40 mL  5-40 mL IntraVENous 2 times per day Boyd, Yazid R, DO   10 mL at 11/07/24 0834    sodium chloride flush 0.9 % injection 5-40 mL  5-40 mL IntraVENous PRN Boyd, Yazid R, DO        0.9 % sodium chloride infusion   IntraVENous PRN Boyd, Yazid R, DO        potassium chloride (KLOR-CON M) extended release tablet 40 mEq  40 mEq Oral PRN Boyd, Yazid R, DO        Or    potassium bicarb-citric acid (EFFER-K) effervescent tablet 40 mEq  40 mEq Oral PRN Boyd, Yazid R, DO        Or    potassium chloride 10 mEq/100 mL IVPB (Peripheral Line)  10 mEq IntraVENous PRN Boyd, Yazid R, DO        magnesium sulfate 2000 mg in 50 mL IVPB premix  2,000 mg IntraVENous PRN Boyd, Yazid R, DO        enoxaparin (LOVENOX) injection 40 mg  40 mg SubCUTAneous Daily Boyd, Yazid R, DO   40 mg at 11/07/24 0826    ondansetron (ZOFRAN-ODT) disintegrating tablet 4 mg  4 mg Oral Q8H PRN Boyd, Yazid R, DO        Or    ondansetron (ZOFRAN) injection 4 mg  4 mg IntraVENous Q6H PRN Boyd, Yazid R, DO        polyethylene glycol (GLYCOLAX) packet 17 g  17 g Oral Daily PRN Boyd, Yazid R, DO        acetaminophen (TYLENOL) tablet 650 mg  650 mg Oral Q6H PRN Boyd, Yazid R, DO        Or    acetaminophen (TYLENOL) suppository 650 mg  650 mg Rectal Q6H PRN Boyd, Yazid R, DO        cefTRIAXone (ROCEPHIN) 1,000 mg in sterile water 10 mL IV syringe  1,000 mg IntraVENous Q24H Boyd, Yazid R, DO   1,000 mg at 11/07/24 0834    ARIPiprazole (ABILIFY) tablet 2 mg  2 mg Oral Daily Magno Menchaca MD   2 mg at 11/07/24 0826       New information updated in the note today, rest of the examination did not change compared to yesterday.    Additional work up or/and treatment plan may be added today or then after based on clinical progression. I am

## 2024-11-07 NOTE — PLAN OF CARE
Problem: Safety - Adult  Goal: Free from fall injury  11/7/2024 0932 by Linette Marie, RN  Outcome: Progressing  11/6/2024 2350 by Michela Graza, RN  Outcome: Progressing     Problem: Skin/Tissue Integrity  Goal: Absence of new skin breakdown  Description: 1.  Monitor for areas of redness and/or skin breakdown  2.  Assess vascular access sites hourly  3.  Every 4-6 hours minimum:  Change oxygen saturation probe site  4.  Every 4-6 hours:  If on nasal continuous positive airway pressure, respiratory therapy assess nares and determine need for appliance change or resting period.  11/7/2024 0932 by Linette Marie, RN  Outcome: Progressing  11/6/2024 2350 by Michela Garza, RN  Outcome: Progressing     Problem: Pain  Goal: Verbalizes/displays adequate comfort level or baseline comfort level  Outcome: Progressing

## 2024-11-07 NOTE — PROGRESS NOTES
Patient currently on IV abx, Dr. Nix notified pt has purulent drainage noted from simon area, with foul/yeast smell, excoriation noted between thighs. Order received for micotin powder and applied. 1:1 removed per order from krysta Hui camera placed in room for patient safety.    1530- Pt's son at bedside updated on plan of care. Pt more talkative at this time, smiling with son. Pt's son states she appears more at her baseline today.

## 2024-11-08 PROCEDURE — 1210000000 HC MED SURG R&B

## 2024-11-08 PROCEDURE — 97166 OT EVAL MOD COMPLEX 45 MIN: CPT

## 2024-11-08 PROCEDURE — 6360000002 HC RX W HCPCS: Performed by: INTERNAL MEDICINE

## 2024-11-08 PROCEDURE — 2580000003 HC RX 258: Performed by: INTERNAL MEDICINE

## 2024-11-08 PROCEDURE — 6370000000 HC RX 637 (ALT 250 FOR IP): Performed by: INTERNAL MEDICINE

## 2024-11-08 PROCEDURE — 6370000000 HC RX 637 (ALT 250 FOR IP): Performed by: PSYCHIATRY & NEUROLOGY

## 2024-11-08 PROCEDURE — 99232 SBSQ HOSP IP/OBS MODERATE 35: CPT | Performed by: PSYCHIATRY & NEUROLOGY

## 2024-11-08 PROCEDURE — 97162 PT EVAL MOD COMPLEX 30 MIN: CPT

## 2024-11-08 RX ORDER — ARIPIPRAZOLE 2 MG/1
2 TABLET ORAL
Qty: 30 TABLET | Refills: 3 | Status: SHIPPED | OUTPATIENT
Start: 2024-11-08 | End: 2024-11-09

## 2024-11-08 RX ORDER — LOSARTAN POTASSIUM 50 MG/1
50 TABLET ORAL DAILY
Qty: 30 TABLET | Refills: 3 | Status: SHIPPED | OUTPATIENT
Start: 2024-11-09 | End: 2024-11-09

## 2024-11-08 RX ADMIN — SODIUM CHLORIDE, PRESERVATIVE FREE 10 ML: 5 INJECTION INTRAVENOUS at 19:42

## 2024-11-08 RX ADMIN — SODIUM CHLORIDE, PRESERVATIVE FREE 10 ML: 5 INJECTION INTRAVENOUS at 09:44

## 2024-11-08 RX ADMIN — MICONAZOLE NITRATE: 2 POWDER TOPICAL at 09:45

## 2024-11-08 RX ADMIN — ARIPIPRAZOLE 2 MG: 2 TABLET ORAL at 19:42

## 2024-11-08 RX ADMIN — LOSARTAN POTASSIUM 50 MG: 25 TABLET, FILM COATED ORAL at 09:46

## 2024-11-08 RX ADMIN — MICONAZOLE NITRATE: 2 POWDER TOPICAL at 19:42

## 2024-11-08 RX ADMIN — ENOXAPARIN SODIUM 40 MG: 100 INJECTION SUBCUTANEOUS at 09:45

## 2024-11-08 NOTE — PROGRESS NOTES
[facility]  BEHAVIORAL HEALTH FOLLOW-UP NOTE       11/8/2024     Patient was seen and examined in person, Chart reviewed   Patient's case discussed with staff/team    Chief Complaint: AMS    Interim History:     Patient is awake but catatonic  Very slow response, mute  All medical cause has been ruled out  Pt with H/O Schizophrenia   Current presentation does not appear to be sec to delirium    Appetite:   [] Normal/Unchanged  [] Increased  [x] Decreased      Sleep:       [] Normal/Unchanged  [] Fair       [x] Poor              Energy:    [] Normal/Unchanged  [] Increased  [x] Decreased        SI [] Present  [] Absent    HI  []Present  [] Absent     Aggression:  [] yes  [] no    Patient is [] able  [x] unable to CONTRACT FOR SAFETY     PAST MEDICAL/PSYCHIATRIC HISTORY:   No past medical history on file.    FAMILY/SOCIAL HISTORY:  No family history on file.  Social History     Socioeconomic History    Marital status:      Spouse name: Not on file    Number of children: Not on file    Years of education: Not on file    Highest education level: Not on file   Occupational History    Not on file   Tobacco Use    Smoking status: Never    Smokeless tobacco: Not on file   Substance and Sexual Activity    Alcohol use: Not on file    Drug use: Not on file    Sexual activity: Not on file   Other Topics Concern    Not on file   Social History Narrative    Not on file     Social Determinants of Health     Financial Resource Strain: Not on file   Food Insecurity: Food Insecurity Present (11/6/2024)    Hunger Vital Sign     Worried About Running Out of Food in the Last Year: Sometimes true     Ran Out of Food in the Last Year: Never true   Transportation Needs: No Transportation Needs (11/6/2024)    PRAPARE - Transportation     Lack of Transportation (Medical): No     Lack of Transportation (Non-Medical): No   Physical Activity: Not on file   Stress: Not on file   Social Connections: Not on file  Eileen Nix R, DO    acetaminophen (TYLENOL) tablet 650 mg, 650 mg, Oral, Q6H PRN **OR** acetaminophen (TYLENOL) suppository 650 mg, 650 mg, Rectal, Q6H PRN, Eileen Nix, DO      Examination:  BP (!) 155/71   Pulse 82   Temp 98.8 °F (37.1 °C) (Oral)   Resp 18   Ht 1.575 m (5' 2.01\")   Wt 72.6 kg (160 lb)   SpO2 97%   BMI 29.26 kg/m²   Gait - in bed  Medication side effects(SE): no    Mental Status Examination:    Alert, unable to assess orientation  Pt is mute with poor insight and Judgement  Unable to perform MSE    ASSESSMENT:   Patient symptoms are:  [] Well controlled  [] Improving  [] Worsening  [] No change      Diagnosis:   Catatonia- ? Sec to Schizophrenia / mood disorder      LABS:    Recent Labs     11/05/24 2011 11/07/24  0542   WBC 8.4 6.3   HGB 15.4 13.7    225     Recent Labs     11/05/24 2012 11/07/24  0542    139   K 4.1 4.0    105   CO2 27 25   BUN 33* 25*   CREATININE 0.72 0.51   GLUCOSE 158* 106*     Recent Labs     11/05/24 2012 11/07/24  0542   BILITOT 0.7 0.6   ALKPHOS 93 75   AST 39* 21   ALT 15 10     Lab Results   Component Value Date/Time    BARBSCNU Neg 11/05/2024 10:56 PM    LABBENZ Neg 11/05/2024 10:56 PM    LABMETH Neg 11/05/2024 10:56 PM    ETOH <10 11/05/2024 08:36 PM     Lab Results   Component Value Date/Time    TSH 2.290 11/05/2024 08:12 PM     No results found for: \"LITHIUM\"  No results found for: \"VALPROATE\", \"CBMZ\"    RISK ASSESSMENT:     Treatment Plan:  Reviewed current Medications with the patient.   Recommend IP psych admit  Son did not want her to be admitted to psych- want her to be taken home, with 24/7 family around her to support and help her  Can be discharged from psych standpoint to go home    Electronically signed by KEISHA SHIN MD on 11/8/2024 at 11:58 AM

## 2024-11-08 NOTE — PLAN OF CARE
See OT evaluation for all goals and OT POC. Electronically signed by Jane Garcia OTR/L on 11/8/2024 at 12:59 PM

## 2024-11-08 NOTE — DISCHARGE SUMMARY
Hospital Medicine Discharge Summary    Penny Katz  :  1950  MRN:  [id number]    Admit date:  2024  Discharge date:  2024    Admitting Physician:  Eileen Nix DO  Primary Care Physician:  No primary care provider on file.      Discharge Diagnoses:        Acute encephalopathy  UTI ruled out  Bilateral lower extremity edema-DVT ruled out, rule out CHF  Hypertension  Noncompliance with medications  Depression    Chief Complaint   Patient presents with    Leg Swelling     Patient has extreme swelling in BLE. Patient also CO increased confusion. Denies pain/SOB     Hospital Course:     Patient presented to the hospital with leg swelling and confusion.  She has not been taking her medication including antihypertensive medications.  She had acute encephalopathy.  There was concern for UTI initially.  She was assessed by psychiatry team, her mood was down and she was depressed but she did not have suicidal or homicidal ideation.  She was oriented x 3.  Her mentation improved.  UTI was ruled out.  She was discharged home in stable condition on Abilify and antihypertensive medications.  She was educated in taking her medications.    Exam on discharge:   BP (!) 155/71   Pulse 82   Temp 98.8 °F (37.1 °C) (Oral)   Resp 18   Ht 1.575 m (5' 2.01\")   Wt 72.6 kg (160 lb)   SpO2 97%   BMI 29.26 kg/m²   General appearance: No apparent distress, appears stated age and cooperative.  HEENT: Pupils equal, round, and reactive to light. Conjunctivae/corneas clear.  Neck: Supple, with full range of motion. No jugular venous distention. Trachea midline.  Respiratory:  Normal respiratory effort. Clear to auscultation, bilaterally without Rales/Wheezes/Rhonchi.  Cardiovascular: Regular rate and rhythm with normal S1/S2 without murmurs, rubs or gallops.  Abdomen: Soft, non-tender, non-distended with normal bowel sounds.  Musculoskeletal: No clubbing, cyanosis or edema bilaterally.  Full range of motion without  mastoid air cells demonstrate no acute abnormality. SOFT TISSUES/SKULL:  No acute abnormality of the visualized skull or soft tissues.     1. No acute intracranial abnormality. 2. Mild age-appropriate atrophy and minimal small vessel ischemia.     Vascular duplex lower extremity venous bilateral    Result Date: 11/5/2024  EXAMINATION: DUPLEX VENOUS ULTRASOUND OF THE BILATERAL LOWER TAIXNIFOHNR54/5/2024 9:08 pm TECHNIQUE: Duplex ultrasound using B-mode/gray scaled imaging and Doppler spectral analysis and color flow was obtained of the deep venous structures of the bilateral extremities. COMPARISON: None. HISTORY: ORDERING SYSTEM PROVIDED HISTORY: Edema right calf TECHNOLOGIST PROVIDED HISTORY: What reading provider will be dictating this exam?->CRC FINDINGS: The visualized veins of the bilateral lower extremities are patent and free of echogenic thrombus. The veins demonstrate good compressibility with normal color flow study and spectral analysis.     No evidence of DVT in either lower extremity.     XR CHEST PORTABLE    Result Date: 11/5/2024  EXAMINATION: ONE XRAY VIEW OF THE CHEST 11/5/2024 8:09 pm COMPARISON: None. HISTORY: ORDERING SYSTEM PROVIDED HISTORY: chf TECHNOLOGIST PROVIDED HISTORY: Reason for exam:->chf What reading provider will be dictating this exam?->CRC FINDINGS: Adequate and symmetric aeration of the lungs. There are no formed consolidations, pleural effusions, or pneumothoraces. Trachea and central mainstem bronchi appear clear.  Atherosclerotic disease in the aortic arch. The remaining cardiomediastinal silhouette and pulmonary vascularity appear within normal limits. Osseous and thoracic soft tissue structures demonstrate no acute findings.     Atherosclerotic disease. No additional evidence of active cardiopulmonary pathology.       Discharge Medications:         Medication List        START taking these medications      ARIPiprazole 2 MG tablet  Commonly known as: ABILIFY  Take 1 tablet by  mouth nightly     losartan 50 MG tablet  Commonly known as: COZAAR  Take 1 tablet by mouth daily  Start taking on: November 9, 2024            STOP taking these medications      risperiDONE 1 MG tablet  Commonly known as: RISPERDAL               Where to Get Your Medications        These medications were sent to [facility] - [place], [place] - [address] - P [phone] - F [phone]  [address] [place] [address]      Phone: [phone]   ARIPiprazole 2 MG tablet  losartan 50 MG tablet         Disposition:   If discharged to Home, Any [facility] needs that were indicated and/or required as been addressed and set up by Social Work.     Condition at discharge: good     Activity: activity as tolerated    Total time taken for discharging this patient: 40 minutes. Greater than 70% of time was spent focused exclusively on this patient. Time was taken to review chart, discuss plans with consultants, reconciling medications, discussing plan answering questions with patient.     Signed:  Eileen Nix DO  11/8/2024, 10:56 AM  ----------------------------------------------------------------------------------------------------------------------    Penny Katz

## 2024-11-08 NOTE — DISCHARGE INSTRUCTIONS
Follow up with primary care physician in the next 7 days or sooner if needed. If you do not have a Primary care physician, please schedule an appointment with one. Please ask prior to discharge about a list of local providers.     Please return to ER or call 911 if you develop any significant signs or symptoms.    I may not have addressed all of your medical illnesses or the abnormal blood work or imaging therefore please ask your PCP to obtain [facility] record to follow up on all of the abnormal labs, imaging and findings that I have and have not addressed during your hospitalization.     Discharging you from the hospital does not mean that your medical care ends here and now. You may still need additional work up, investigation, monitoring, and treatment to be handled from this point on by outside providers including your PCP, Specialists and other healthcare providers.     For medication questions, contact your retail pharmacy and your PCP.    Your medical team at [facility] appreciates the opportunity to work with you to get well!    Eileen Nix DO  10:56 AM

## 2024-11-08 NOTE — PROGRESS NOTES
CLINICAL PHARMACY NOTE: MEDS TO BEDS    Total # of Prescriptions Filled: 2   The following medications were delivered to the patient:  Aripiprazole 2 mg Tab  Losartan 50 mg Tab    Additional Documentation:

## 2024-11-08 NOTE — PLAN OF CARE
Problem: Discharge Planning  Goal: Discharge to home or other facility with appropriate resources  11/8/2024 1659 by Minerva Pak RN  Outcome: Adequate for Discharge  11/8/2024 1047 by Minerva Pak RN  Outcome: Progressing     Problem: Safety - Adult  Goal: Free from fall injury  11/8/2024 1659 by Minerva Pak RN  Outcome: Adequate for Discharge  11/8/2024 1047 by Minerva Pak RN  Outcome: Progressing     Problem: Skin/Tissue Integrity  Goal: Absence of new skin breakdown  Description: 1.  Monitor for areas of redness and/or skin breakdown  2.  Assess vascular access sites hourly  3.  Every 4-6 hours minimum:  Change oxygen saturation probe site  4.  Every 4-6 hours:  If on nasal continuous positive airway pressure, respiratory therapy assess nares and determine need for appliance change or resting period.  11/8/2024 1659 by Minerva Pak RN  Outcome: Adequate for Discharge  11/8/2024 1047 by Minerva Pak RN  Outcome: Progressing     Problem: Pain  Goal: Verbalizes/displays adequate comfort level or baseline comfort level  11/8/2024 1659 by Minerva Pak RN  Outcome: Adequate for Discharge  11/8/2024 1047 by Minerva Pak RN  Outcome: Progressing

## 2024-11-08 NOTE — PROGRESS NOTES
[facility] OCCUPATIONAL THERAPY EVALUATION - ACUTE     NAME: Penny Katz  : 1950 (74 y.o.)  MRN: [id number]  CODE STATUS: Full Code  Room: [facility]/88-01    Date of Service: 2024    Patient Diagnosis(es): Peripheral edema [R60.0]  Acute cystitis without hematuria [N30.00]  H/O psychiatric hospitalization [Z86.59]  Hypertension, unspecified type [I10]  AMS (altered mental status) [R41.82]   Patient Active Problem List    Diagnosis Date Noted    AMS (altered mental status) 2024        No past medical history on file.  No past surgical history on file.     Restrictions  Restrictions/Precautions: Fall Risk, Up as Tolerated     Safety Devices: Safety Devices  Type of Devices: Call light within reach;All fall risk precautions in place;Chair alarm in place;Left in chair;Nurse notified     Patient's date of birth confirmed: Yes    General:  Chart Reviewed: Yes  Patient assessed for rehabilitation services?: Yes    Subjective  Subjective: Pt very flat, minimally conversational       Pain at start of treatment: No    Pain at end of treatment: No    Location:   Description:   Nursing notified: Not Applicable  RN:   Intervention: Repositioned    Prior Level of Function:  Social/Functional History  Lives With: Son  Type of Home: House  Home Layout: One level  Bathroom Shower/Tub: Tub/Shower unit  Additional Comments: Pt confused, significant difficulty answering home and PLOF questions    OBJECTIVE:     Orientation Status:  Orientation  Overall Orientation Status: Impaired    Observation:  Observation/Palpation  Posture: Fair  Observation: Pt alert, decreased attention, required cues for all tasks, flat affect, minimally conversational    Cognition Status:  Cognition  Overall Cognitive Status: Exceptions  Arousal/Alertness: Delayed responses to stimuli  Following Commands: Inconsistently follows commands  Attention Span: Difficulty attending to directions;Difficulty dividing attention  Memory: Decreased  medical history is moderately complex  Exam: Pt has 10 performance deficits  Assistance / Modification: Pt requires max A    AMPAC (Six Click) Self care Score   How much help is needed for putting on and taking off regular lower body clothing?: Total  How much help is needed for bathing (which includes washing, rinsing, drying)?: Total  How much help is needed for toileting (which includes using toilet, bedpan, or urinal)?: A Lot  How much help is needed for putting on and taking off regular upper body clothing?: A Little  How much help is needed for taking care of personal grooming?: A Little  How much help for eating meals?: A Little  AM-[facility] Inpatient Daily Activity Raw Score: 13  AM-PAC Inpatient ADL T-Scale Score : 32.03  ADL Inpatient CMS 0-100% Score: 63.03    Therapy key for assistance levels -   Independent/Mod I = Pt. is able to perform task with no assistance but may require a device   Stand by assistance = Pt. does not perform task at an independent level but does not need physical assistance, requires verbal cues  Minimal, Moderate, Maximal Assistance = Pt. requires physical assistance (25%, 50%, 75% assist from helper) for task but is able to actively participate in task   Dependent = Pt. requires total assistance with task and is not able to actively participate with task completion     Plan:  Occupational Therapy Plan  Times Per Week: 1-4x  Therapy Duration:  (Length of acute stay)  Current Treatment Recommendations: Strengthening, Balance training, Functional mobility training, Endurance training, Pain management, Safety education & training, Patient/Caregiver education & training, Equipment evaluation, education, & procurement, Self-Care / ADL, Home management training, Cognitive/Perceptual training, Cognitive reorientation    Goals:   Patient will:    - Improve functional endurance to tolerate/complete 30 mins of ADL's  - Be Setup in UB ADLs   - Be Min A in LB ADLs  - Be Min A in ADL transfers

## 2024-11-08 NOTE — CARE COORDINATION
Per bedside nurse, Dr. Menchaca communicated that pt can return home with family. Spoke with pt son, Mac, by telephone. DC plan for pt to return home w/son, Stoney. Pt family is with pt 24 hr a day. Declines [facility].  
SPOKE W/PT AND SON HILLARY VIA PHONE TO DISCUSS DISCHARGE PLAN WHICH IS HOME W/SON FIORDALIZA. DENIES HOME GOING NEEDS. HILLARY HAS PCP LIST AND WILL CHOOSE. FAMILY IS SUPPORTIVE AND ALTERNATES CARING FOR HER. REVIEWED PG 1 IMM W/SON  AND PT WHO VERBALIZES ACKNOWLEDGEMENT. SON SAID PT AMBULATES W/CANE WHEN NEEDED. CURRENTLY W/AVASYS IN ROOM.   
    The Plan for Transition of Care is related to the following treatment goals of Peripheral edema [R60.0]  Acute cystitis without hematuria [N30.00]  H/O psychiatric hospitalization [Z86.59]  Hypertension, unspecified type [I10]  AMS (altered mental status) [R41.82]    IF APPLICABLE: The Patient and/or patient representative Penny and her family were provided with a choice of provider and agrees with the discharge plan. Freedom of choice list with basic dialogue that supports the patient's individualized plan of care/goals and shares the quality data associated with the providers was provided to:     Patient Representative Name:       The Patient and/or Patient Representative Agree with the Discharge Plan?      Fozia Whitt  Case Management Department

## 2024-11-08 NOTE — PLAN OF CARE
Problem: Discharge Planning  Goal: Discharge to home or other facility with appropriate resources  11/8/2024 1047 by Minerva Pak RN  Outcome: Progressing  11/8/2024 0249 by Christina Shook RN  Outcome: Progressing     Problem: Safety - Adult  Goal: Free from fall injury  11/8/2024 1047 by Minerva Pak RN  Outcome: Progressing  11/8/2024 0249 by Christina Shook RN  Outcome: Progressing     Problem: Skin/Tissue Integrity  Goal: Absence of new skin breakdown  Description: 1.  Monitor for areas of redness and/or skin breakdown  2.  Assess vascular access sites hourly  3.  Every 4-6 hours minimum:  Change oxygen saturation probe site  4.  Every 4-6 hours:  If on nasal continuous positive airway pressure, respiratory therapy assess nares and determine need for appliance change or resting period.  11/8/2024 1047 by Minerva Pak, RN  Outcome: Progressing  11/8/2024 0249 by Christina Shook, RN  Outcome: Progressing     Problem: Pain  Goal: Verbalizes/displays adequate comfort level or baseline comfort level  11/8/2024 1047 by Minerva Pak RN  Outcome: Progressing  11/8/2024 0249 by Christina Shook, RN  Outcome: Progressing

## 2024-11-08 NOTE — PROGRESS NOTES
Physical Therapy Med Surg Initial Assessment  Facility/Department: [address] ORTHO TELE  Room: [place] [address]       NAME: Penny Katz  : 1950 (74 y.o.)  MRN: [id number]  CODE STATUS: Full Code    Date of Service: 2024    Patient Diagnosis(es): Peripheral edema [R60.0]  Acute cystitis without hematuria [N30.00]  H/O psychiatric hospitalization [Z86.59]  Hypertension, unspecified type [I10]  AMS (altered mental status) [R41.82]   Chief Complaint   Patient presents with    Leg Swelling     Patient has extreme swelling in BLE. Patient also CO increased confusion. Denies pain/SOB     Patient Active Problem List    Diagnosis Date Noted    AMS (altered mental status) 2024        No past medical history on file.  No past surgical history on file.    Chart Reviewed: Yes  Patient assessed for rehabilitation services?: Yes  Family / Caregiver Present: No  Diagnosis: AMS (altered mental status)  General Comment  Comments: Pt resting in bed - agreeble to PT evaluation    Restrictions:  Restrictions/Precautions: Fall Risk, Up as Tolerated     SUBJECTIVE:   Subjective: Pt mostly nonverbal throughout session    Pain  Pain: No outward s/s of pain during session.    Prior Level of Function:  Social/Functional History  Lives With: Son  Type of Home: House  Home Layout: One level  Bathroom Shower/Tub: Tub/Shower unit  Additional Comments: Pt confused, significant difficulty answering home and PLOF questions    OBJECTIVE:   Vision  Vision: Impaired  Vision Exceptions: Wears glasses at all times  Hearing: Within functional limits (Appears WFL)    Cognition:  Overall Orientation Status: Impaired  Follows Commands: Within Functional Limits  Overall Cognitive Status: Exceptions  Cognition Comment: Decreased sequencing and safety, requires direct cuing for all tasks    Observation/Palpation  Observation: Pt alert, decreased attention, required cues for all tasks, flat affect, minimally conversational    ROM:  AROM: Within

## 2024-11-08 NOTE — PROGRESS NOTES
[facility]  MUSIC THERAPY      Date:  11/8/2024        Patient Name: Penny Katz       MRN: [id number]        YOB: 1950 (74 y.o.)       Gender: female          RESTRICTIONS/PRECAUTIONS:  Restrictions/Precautions: Fall Risk, Up as Tolerated     Hearing: Within functional limits (Appears WFL)      TIME OF SESSION: 11:45am - 12:00pm     SUBJECTIVE:  \"If you want to\"     OBJECTIVE:        [x] To Improve Mood     [x] To Increase Social Well-Being  [] To Increase Focus   [x] To Increase Emotional Well-Being  [] To Increase Eye Contact    [x] To Increase Spiritual Well-Being   [] To Decrease Anxiety   [x] To Increase Relaxation   [] To Decrease Pain    [] To Increase Communication  [] To Increase Movement to Music     MUSIC INTERVENTION PROVIDED:     [x] Live Music on Voice  [] Recorded Music   [x] Live Music on Guitar  [x] Discussion Related to Music   [] Live Music on Q-chord  [x] Discussion Related to Pt Experience   [] Live Music on Percussion      PARTICIPATION LEVEL OF PATIENT:     [x] Active with discussion   [] Passive with discussion   [] Active with singing    [x] Passive with singing   [] Active with instrument playing  [] Passive with instrument playing   [x] Actively listening to music   [] Passively listening to music.     OUTCOMES OBSERVED:      [x] Improved Mood   [x] Increased Social Well-Being  [] Increased Focus   [x] Increased Emotional Well-Being  [] Increased Eye Contact    [x] Increased Spiritual Well-Being   [] Decreased Anxiety   [x] Increased Relaxation   [] Decreased Pain    [] Increased Communication   [] Increased Movement to Music     PAIN ASSESSMENT    Before MT:      [x] No     [] Yes   Location:    Rating:  /10    Comment(s):    After MT:         [x] No     [] Yes   Location:     Rating:   /10    Comment(s):         ASSESSMENT/OBSERVATIONS:     Patient's music interests and/or background: unable to identify when asked     Patient tolerated today’s treatment

## 2024-11-09 VITALS
HEIGHT: 62 IN | SYSTOLIC BLOOD PRESSURE: 157 MMHG | HEART RATE: 74 BPM | WEIGHT: 160 LBS | OXYGEN SATURATION: 96 % | DIASTOLIC BLOOD PRESSURE: 73 MMHG | BODY MASS INDEX: 29.44 KG/M2 | RESPIRATION RATE: 20 BRPM | TEMPERATURE: 99 F

## 2024-11-09 PROCEDURE — 6370000000 HC RX 637 (ALT 250 FOR IP): Performed by: INTERNAL MEDICINE

## 2024-11-09 PROCEDURE — 6360000002 HC RX W HCPCS: Performed by: INTERNAL MEDICINE

## 2024-11-09 PROCEDURE — 2580000003 HC RX 258: Performed by: INTERNAL MEDICINE

## 2024-11-09 RX ORDER — LOSARTAN POTASSIUM 50 MG/1
50 TABLET ORAL DAILY
Qty: 30 TABLET | Refills: 3 | Status: SHIPPED | OUTPATIENT
Start: 2024-11-09

## 2024-11-09 RX ORDER — ARIPIPRAZOLE 2 MG/1
2 TABLET ORAL
Qty: 30 TABLET | Refills: 3 | Status: SHIPPED | OUTPATIENT
Start: 2024-11-09

## 2024-11-09 RX ADMIN — SODIUM CHLORIDE, PRESERVATIVE FREE 10 ML: 5 INJECTION INTRAVENOUS at 09:47

## 2024-11-09 RX ADMIN — LOSARTAN POTASSIUM 50 MG: 25 TABLET, FILM COATED ORAL at 09:46

## 2024-11-09 ASSESSMENT — PAIN SCALES - GENERAL: PAINLEVEL_OUTOF10: 0

## 2024-11-09 NOTE — PROGRESS NOTES
Internal Medicine   Hospitalist   Progress Note    2024   8:50 AM    Name:  Penny Katz  MRN:    [id number]     IP Day: 2     Admit Date: 2024  7:50 PM  PCP: No primary care provider on file.    Code Status:  Full Code    Assessment and Plan:        Active Problems/ diagnosis:       Acute encephalopathy  Possible UTI-less likely clinically but UA is positive  Bilateral lower extremity edema-DVT ruled out, rule out CHF  Hypertension  Noncompliance with medications  Depression     Plan  Discharged   resume losartan. Monitor BP  IV Rocephin, follow-up urine culture  Noted  echocardiogram  Home medication ordered and reviewed  Psych consult-  cleared for dc   DVT prophylaxis  Regular diet  Discussed plan with patient  Full code    7 pm- 7 am, please contact on call Hospitalist for any needs     Subjective:     Feeling well. Better than yesterday.     Physical Examination:      Vitals:  /66   Pulse 78   Temp 98.4 °F (36.9 °C) (Oral)   Resp 20   Ht 1.575 m (5' 2.01\")   Wt 72.6 kg (160 lb)   SpO2 98%   BMI 29.26 kg/m²   Temp (24hrs), Av.5 °F (36.9 °C), Min:98.4 °F (36.9 °C), Max:98.6 °F (37 °C)      General appearance: alert, cooperative and no distress  Mental Status: oriented to person, place and time and normal affect  Lungs: clear to auscultation bilaterally, normal effort  Heart: regular rate and rhythm, no murmur  Abdomen: soft, nontender, nondistended, bowel sounds present, no masses  Extremities: no edema, redness, tenderness in the calves  Skin: no gross lesions, rashes    Data:     Labs:  Recent Labs     24  0542   WBC 6.3   HGB 13.7        Recent Labs     24  0542      K 4.0      CO2 25   BUN 25*   CREATININE 0.51   GLUCOSE 106*     Recent Labs     24  0542   AST 21   ALT 10   BILITOT 0.6   ALKPHOS 75       Current Facility-Administered Medications   Medication Dose Route Frequency Provider Last Rate Last Admin    losartan (COZAAR) tablet 50 mg

## 2024-11-09 NOTE — PROGRESS NOTES
Discharge orders in. Spoke with patient's son Mac who states that he will be here around 11:00AM to pick patient up.    [id number]: Pt's son at bedside. Discharge instructions reviewed in detail. No PCP on file. Pt's son made aware. List of [facility] PCP provided. Prescribed meds sent to the [facility] in [place] per son's request. No questions or concerns voiced.    1100: Pt transported out of unit via wheelchair, son at bedside.    Electronically signed by DELMIS WILSON RN on 11/9/24 at 11:01 AM EST

## 2024-11-10 NOTE — PROGRESS NOTES
Physical Therapy  Facility/Department: [facility] MED SURG W288/W288-01  Physical Therapy Discharge      NAME: Penny Katz    : 1950 (74 y.o.)  MRN: [id number]    Account: [id number]  Gender: female      Patient has been discharged from acute care hospital. DC patient from current PT program.      Electronically signed by Luba Anand PT on 11/10/24 at 1:43 PM EST

## 2024-11-13 ENCOUNTER — HOSPITAL ENCOUNTER (EMERGENCY)
Age: 74
Discharge: PSYCHIATRIC OR SUBSTANCE USE FACILITY | End: 2024-11-13
Attending: STUDENT IN AN ORGANIZED HEALTH CARE EDUCATION/TRAINING PROGRAM
Payer: MEDICARE

## 2024-11-13 VITALS
BODY MASS INDEX: 29.26 KG/M2 | TEMPERATURE: 98.4 F | DIASTOLIC BLOOD PRESSURE: 87 MMHG | WEIGHT: 160 LBS | HEART RATE: 83 BPM | SYSTOLIC BLOOD PRESSURE: 158 MMHG | OXYGEN SATURATION: 97 % | RESPIRATION RATE: 16 BRPM

## 2024-11-13 DIAGNOSIS — F20.9 SCHIZOPHRENIA, UNSPECIFIED TYPE (HCC): ICD-10-CM

## 2024-11-13 DIAGNOSIS — R44.3 HALLUCINATIONS: Primary | ICD-10-CM

## 2024-11-13 LAB
ALBUMIN SERPL-MCNC: 3.9 G/DL (ref 3.5–4.6)
ALP SERPL-CCNC: 84 U/L (ref 40–130)
ALT SERPL-CCNC: 15 U/L (ref 0–33)
AMPHET UR QL SCN: NORMAL
ANION GAP SERPL CALCULATED.3IONS-SCNC: 10 MEQ/L (ref 9–15)
APAP SERPL-MCNC: <5 UG/ML (ref 10–30)
AST SERPL-CCNC: 18 U/L (ref 0–35)
BACTERIA URNS QL MICRO: NEGATIVE /HPF
BARBITURATES UR QL SCN: NORMAL
BASOPHILS # BLD: 0 K/UL (ref 0–0.2)
BASOPHILS NFR BLD: 0.4 %
BENZODIAZ UR QL SCN: NORMAL
BILIRUB SERPL-MCNC: 0.5 MG/DL (ref 0.2–0.7)
BILIRUB UR QL STRIP: NEGATIVE
BUN SERPL-MCNC: 12 MG/DL (ref 8–23)
CALCIUM SERPL-MCNC: 9.2 MG/DL (ref 8.5–9.9)
CANNABINOIDS UR QL SCN: NORMAL
CHLORIDE SERPL-SCNC: 102 MEQ/L (ref 95–107)
CHOLEST SERPL-MCNC: 193 MG/DL (ref 0–199)
CK SERPL-CCNC: 47 U/L (ref 0–170)
CLARITY UR: CLEAR
CO2 SERPL-SCNC: 23 MEQ/L (ref 20–31)
COCAINE UR QL SCN: NORMAL
COLOR UR: YELLOW
CREAT SERPL-MCNC: 0.51 MG/DL (ref 0.5–0.9)
DRUG SCREEN COMMENT UR-IMP: NORMAL
EOSINOPHIL # BLD: 0.1 K/UL (ref 0–0.7)
EOSINOPHIL NFR BLD: 1.4 %
EPI CELLS #/AREA URNS AUTO: ABNORMAL /HPF (ref 0–5)
ERYTHROCYTE [DISTWIDTH] IN BLOOD BY AUTOMATED COUNT: 12.1 % (ref 11.5–14.5)
ETHANOL PERCENT: NORMAL G/DL
ETHANOLAMINE SERPL-MCNC: <10 MG/DL (ref 0–0.08)
FENTANYL SCREEN, URINE: NORMAL
GLOBULIN SER CALC-MCNC: 3.4 G/DL (ref 2.3–3.5)
GLUCOSE SERPL-MCNC: 103 MG/DL (ref 70–99)
GLUCOSE UR STRIP-MCNC: NEGATIVE MG/DL
HCT VFR BLD AUTO: 46.7 % (ref 37–47)
HDLC SERPL-MCNC: 57 MG/DL (ref 40–59)
HGB BLD-MCNC: 16.2 G/DL (ref 12–16)
HGB UR QL STRIP: NEGATIVE
HYALINE CASTS #/AREA URNS AUTO: ABNORMAL /HPF (ref 0–5)
KETONES UR STRIP-MCNC: NEGATIVE MG/DL
LDLC SERPL CALC-MCNC: 124 MG/DL (ref 0–129)
LEUKOCYTE ESTERASE UR QL STRIP: ABNORMAL
LYMPHOCYTES # BLD: 1.3 K/UL (ref 1–4.8)
LYMPHOCYTES NFR BLD: 18.2 %
MCH RBC QN AUTO: 33.2 PG (ref 27–31.3)
MCHC RBC AUTO-ENTMCNC: 34.7 % (ref 33–37)
MCV RBC AUTO: 95.7 FL (ref 79.4–94.8)
METHADONE UR QL SCN: NORMAL
MONOCYTES # BLD: 0.6 K/UL (ref 0.2–0.8)
MONOCYTES NFR BLD: 7.9 %
NEUTROPHILS # BLD: 5.1 K/UL (ref 1.4–6.5)
NEUTS SEG NFR BLD: 71.8 %
NITRITE UR QL STRIP: NEGATIVE
OPIATES UR QL SCN: NORMAL
OXYCODONE UR QL SCN: NORMAL
PCP UR QL SCN: NORMAL
PH UR STRIP: 6.5 [PH] (ref 5–9)
PLATELET # BLD AUTO: 269 K/UL (ref 130–400)
POTASSIUM SERPL-SCNC: 4.8 MEQ/L (ref 3.4–4.9)
PROPOXYPH UR QL SCN: NORMAL
PROT SERPL-MCNC: 7.3 G/DL (ref 6.3–8)
PROT UR STRIP-MCNC: NEGATIVE MG/DL
RBC # BLD AUTO: 4.88 M/UL (ref 4.2–5.4)
RBC #/AREA URNS AUTO: ABNORMAL /HPF (ref 0–5)
SALICYLATES SERPL-MCNC: <0.3 MG/DL (ref 15–30)
SODIUM SERPL-SCNC: 135 MEQ/L (ref 135–144)
SP GR UR STRIP: 1.01 (ref 1–1.03)
TRIGL SERPL-MCNC: 59 MG/DL (ref 0–150)
TSH SERPL-MCNC: 1.87 UIU/ML (ref 0.44–3.86)
URINE REFLEX TO CULTURE: YES
UROBILINOGEN UR STRIP-ACNC: 0.2 E.U./DL
WBC # BLD AUTO: 7.1 K/UL (ref 4.8–10.8)
WBC #/AREA URNS AUTO: ABNORMAL /HPF (ref 0–5)

## 2024-11-13 PROCEDURE — 36415 COLL VENOUS BLD VENIPUNCTURE: CPT

## 2024-11-13 PROCEDURE — 82077 ASSAY SPEC XCP UR&BREATH IA: CPT

## 2024-11-13 PROCEDURE — 82550 ASSAY OF CK (CPK): CPT

## 2024-11-13 PROCEDURE — 85025 COMPLETE CBC W/AUTO DIFF WBC: CPT

## 2024-11-13 PROCEDURE — 83036 HEMOGLOBIN GLYCOSYLATED A1C: CPT

## 2024-11-13 PROCEDURE — 80179 DRUG ASSAY SALICYLATE: CPT

## 2024-11-13 PROCEDURE — 87086 URINE CULTURE/COLONY COUNT: CPT

## 2024-11-13 PROCEDURE — 80053 COMPREHEN METABOLIC PANEL: CPT

## 2024-11-13 PROCEDURE — 80307 DRUG TEST PRSMV CHEM ANLYZR: CPT

## 2024-11-13 PROCEDURE — 84443 ASSAY THYROID STIM HORMONE: CPT

## 2024-11-13 PROCEDURE — 99285 EMERGENCY DEPT VISIT HI MDM: CPT

## 2024-11-13 PROCEDURE — 80061 LIPID PANEL: CPT

## 2024-11-13 PROCEDURE — 80143 DRUG ASSAY ACETAMINOPHEN: CPT

## 2024-11-13 PROCEDURE — 81001 URINALYSIS AUTO W/SCOPE: CPT

## 2024-11-13 ASSESSMENT — PAIN - FUNCTIONAL ASSESSMENT: PAIN_FUNCTIONAL_ASSESSMENT: NONE - DENIES PAIN

## 2024-11-13 NOTE — ED PROVIDER NOTES
- 31.3 pg    MCHC 34.7 33.0 - 37.0 %    RDW 12.1 11.5 - 14.5 %    Platelets 269 130 - 400 K/uL    Neutrophils % 71.8 %    Lymphocytes % 18.2 %    Monocytes % 7.9 %    Eosinophils % 1.4 %    Basophils % 0.4 %    Neutrophils Absolute 5.1 1.4 - 6.5 K/uL    Lymphocytes Absolute 1.3 1.0 - 4.8 K/uL    Monocytes Absolute 0.6 0.2 - 0.8 K/uL    Eosinophils Absolute 0.1 0.0 - 0.7 K/uL    Basophils Absolute 0.0 0.0 - 0.2 K/uL   CK   Result Value Ref Range    Total CK 47 0 - 170 U/L   Comprehensive Metabolic Panel   Result Value Ref Range    Sodium 135 135 - 144 mEq/L    Potassium 4.8 3.4 - 4.9 mEq/L    Chloride 102 95 - 107 mEq/L    CO2 23 20 - 31 mEq/L    Anion Gap 10 9 - 15 mEq/L    Glucose 103 (H) 70 - 99 mg/dL    BUN 12 8 - 23 mg/dL    Creatinine 0.51 0.50 - 0.90 mg/dL    Est, Glom Filt Rate >90.0 >60    Calcium 9.2 8.5 - 9.9 mg/dL    Total Protein 7.3 6.3 - 8.0 g/dL    Albumin 3.9 3.5 - 4.6 g/dL    Total Bilirubin 0.5 0.2 - 0.7 mg/dL    Alkaline Phosphatase 84 40 - 130 U/L    ALT 15 0 - 33 U/L    AST 18 0 - 35 U/L    Globulin 3.4 2.3 - 3.5 g/dL   Ethanol   Result Value Ref Range    Ethanol Lvl <10 mg/dL    Ethanol percent Not indicated G/dL   Lipid Panel   Result Value Ref Range    Cholesterol, Total 193 0 - 199 mg/dL    Triglycerides 59 0 - 150 mg/dL    HDL 57 40 - 59 mg/dL    LDL Cholesterol 124 0 - 129 mg/dL   Salicylate   Result Value Ref Range    Salicylate Lvl <0.3 (L) 15.0 - 30.0 mg/dL   TSH   Result Value Ref Range    TSH 1.870 0.440 - 3.860 uIU/mL   Urine Drug Screen   Result Value Ref Range    Amphetamine Screen, Ur Neg Negative <1000 ng/mL    Barbiturate Screen, Ur Neg Negative < 200 ng/mL    Benzodiazepine Screen, Urine Neg Negative < 200 ng/mL    Cannabinoid Scrn, Ur Neg Negative < 50 ng/mL    Cocaine Metabolite Screen, Urine Neg Negative < 300 ng/mL    Opiate Screen, Urine Neg Negative < 300 ng/mL    Phencyclidine (PCP), Screen, Urine Neg Negative < 25 ng/mL    Methadone Screen, Urine Neg Negative <300 ng/mL

## 2024-11-13 NOTE — ED TRIAGE NOTES
The Pt was brought to the ER by her son for A/V hallucinations after recently starting abilify, Pt is A&OX4, calm, afebrile, breathes are equal and unlabored, denies SI/HI.

## 2024-11-13 NOTE — ED NOTES
Provisional Diagnosis:     Schizophrenia     Psychosocial and Contextual Factors:     Pt lives with her son. She is alert and oriented x2.        Insurance:    Medicare    C-SSRS Suicide Screening    C-SSRS Suicide Screening1) Within the past month, have you wished you were dead or wished you could go to sleep and not wake up? : No2) Have you actually had any thoughts of killing yourself? : No6) Have you ever done anything, started to do anything, or prepared to do anything to end your life?: NoRisk of Suicide: No Risk    Patient:   Pt is calm and cooperative, very slow movement. Paranoid and confused.     Family:    Son at bedside, shows concern for pt     Agency:   None    Substance Abuse:   Denies     Present Suicidal Behavior:      Denies     Past Suicidal Behavior:       Denies     Self-Injurious/Self-Mutilation:   Denies    Hallucinations/Delusions:    Pt experiencing auditory and visual hallucinations. Unable to explain what the voices are saying. Pt states she is seeing shadows and what seems like the world is caving in. She also mentioned that she feels like a force is pulling her back and is causing her to feel unsteady, which I preventing her from independently doing her ADL's like normal.     Violence Current or Past:    Denies    Access to Weapons:    Denies    Abuse/Trauma Identified:     Denies      Protective Factors:     Supportive family   Aware of symptoms    Seeking help    Risk Factors:       Been off her meds for 15 years     Paranoia     Hallucinations    Clinical Summary:     Pt experiencing auditory and visual hallucinations. Unable to explain what the voices are saying. Pt states she is seeing shadows and what seems like the walls are caving in on her and its trying to kill everybody. She also mentioned that she feels like a force is pulling her back and is causing her to feel unsteady, which I preventing her from independently doing her ADL's like normal. She denies SI/HI. Reports poor

## 2024-11-13 NOTE — ED NOTES
Son to [facility] with patient. States that something has changed over the past 2 weeks, recently she was admitted medically with a psych consult. She has a history of schizophrenia but has not taken medications in over 15 years. While inpatient recently she was started on Abilify. He states since starting Abilify and being home from the hospital her symptoms have worsened. She is frequently screaming out, \"help me, we are going to die.\" He reports that she is seeing shadows and hearing voices. Has extremely paranoid behaviors, thinks everyone is going to die and asks her family to protect her from things that's happened in the 1920s. He reports her sleep is broken and she wakes frequently crying and confused but states she has no tears when she cries out. He states she is so paranoid that while sitting in her recliner chair she sticks her hands down in the cushion holding onto the chair because she is afraid someone is going to get her. Reports any loud noises she is terrified of.

## 2024-11-14 LAB
ESTIMATED AVERAGE GLUCOSE: 91 MG/DL
HBA1C MFR BLD: 4.8 % (ref 4–6)

## 2024-11-14 NOTE — ED NOTES
[facility] at bedside for transport. Pt cooperative. Called [facility] to give report, no answer or ability to leave voicemail.

## 2024-11-15 LAB — BACTERIA UR CULT: NORMAL

## 2024-11-20 NOTE — PROGRESS NOTES
Physician Progress Note      PATIENT:               RICARDO SWAN  CSN #:                  [id number]  :                       1950  ADMIT DATE:       2024 7:50 PM  DISCH DATE:        2024 10:59 AM  RESPONDING  PROVIDER #:        Eileen Nix DO          QUERY TEXT:    Pt admitted with acute encephalopathy. Pt noted to have catatonia,  If   possible, please document in progress notes and discharge summary further   specificity regarding the type of encephalopathy:    The medical record reflects the following:  Risk Factors: h/o schizophrenia  Clinical Indicators:  Dr. Menchaca \"Catatonia- ? Sec to Schizophrenia /   mood disorder\". \"Patient is awake but catatonic\" \"Very slow response, mute\"   \"All medical cause has been ruled out\".  \"Patient is somnolent, but able   to be aroused\" \"Unable to obtain any meaningful information from the patient\"   \"Patient received Abilify but she was lethargic even before she received   Abilify\".  \" Patient is alert and oriented x 2.  She was apparently   telling her son that he is dead although was standing in front of her.    Patient reports that she does have thoughts like that on  Treatment: Abilify, Recommend IP psych admit per psych consult    Thank you,  Evangelina Teresa   Clinical Documentation Improvement Specialist  W: [phone]  Options provided:  -- Encephalopathy likely due to schizophrenia/mood disorder  -- Encephalopathy due to, please specify, Please document etiology of   encephalopathy  -- This patient has encephalopathy of unclear etiology.  -- Other - I will add my own diagnosis  -- Disagree - Not applicable / Not valid  -- Disagree - Clinically unable to determine / Unknown  -- Refer to Clinical Documentation Reviewer    PROVIDER RESPONSE TEXT:    This patient has encephalopathy likely due to schizophrenia/mood disorder.    Query created by: Jil Teresa on 2024 2:58 PM      Electronically signed by:  Eileen Nix DO

## 2025-08-03 ENCOUNTER — HOSPITAL ENCOUNTER (EMERGENCY)
Age: 75
Discharge: HOME | End: 2025-08-03
Payer: MEDICARE

## 2025-08-03 VITALS
OXYGEN SATURATION: 97 % | HEIGHT: 62 IN | SYSTOLIC BLOOD PRESSURE: 179 MMHG | BODY MASS INDEX: 27.53 KG/M2 | RESPIRATION RATE: 18 BRPM | TEMPERATURE: 98.6 F | HEART RATE: 99 BPM | DIASTOLIC BLOOD PRESSURE: 96 MMHG | WEIGHT: 149.6 LBS

## 2025-08-03 DIAGNOSIS — N30.00 ACUTE CYSTITIS WITHOUT HEMATURIA: Primary | ICD-10-CM

## 2025-08-03 LAB
BACTERIA URNS QL MICRO: NEGATIVE /HPF
BILIRUB UR QL STRIP: NEGATIVE
CLARITY UR: CLEAR
COLOR UR: YELLOW
EPI CELLS #/AREA URNS AUTO: NORMAL /HPF (ref 0–5)
GLUCOSE UR STRIP-MCNC: NEGATIVE MG/DL
HGB UR QL STRIP: NEGATIVE
HYALINE CASTS #/AREA URNS AUTO: NORMAL /HPF (ref 0–5)
KETONES UR STRIP-MCNC: NEGATIVE MG/DL
LEUKOCYTE ESTERASE UR QL STRIP: ABNORMAL
NITRITE UR QL STRIP: NEGATIVE
PH UR STRIP: 5.5 [PH] (ref 5–9)
PROT UR STRIP-MCNC: NEGATIVE MG/DL
RBC #/AREA URNS AUTO: NORMAL /HPF (ref 0–5)
SP GR UR STRIP: 1 (ref 1–1.03)
URINE REFLEX TO CULTURE: ABNORMAL
UROBILINOGEN UR STRIP-ACNC: 0.2 E.U./DL
WBC #/AREA URNS AUTO: NORMAL /HPF (ref 0–5)

## 2025-08-03 PROCEDURE — 81001 URINALYSIS AUTO W/SCOPE: CPT

## 2025-08-03 PROCEDURE — 6370000000 HC RX 637 (ALT 250 FOR IP)

## 2025-08-03 PROCEDURE — 99283 EMERGENCY DEPT VISIT LOW MDM: CPT

## 2025-08-03 RX ORDER — CEPHALEXIN 500 MG/1
500 CAPSULE ORAL 2 TIMES DAILY
Qty: 14 CAPSULE | Refills: 0 | Status: SHIPPED | OUTPATIENT
Start: 2025-08-03 | End: 2025-08-10

## 2025-08-03 RX ORDER — CEPHALEXIN 500 MG/1
500 CAPSULE ORAL ONCE
Status: COMPLETED | OUTPATIENT
Start: 2025-08-03 | End: 2025-08-03

## 2025-08-03 RX ADMIN — CEPHALEXIN 500 MG: 500 CAPSULE ORAL at 19:44

## 2025-08-03 ASSESSMENT — LIFESTYLE VARIABLES
HOW MANY STANDARD DRINKS CONTAINING ALCOHOL DO YOU HAVE ON A TYPICAL DAY: 1 OR 2
HOW OFTEN DO YOU HAVE A DRINK CONTAINING ALCOHOL: MONTHLY OR LESS

## 2025-08-03 ASSESSMENT — PAIN - FUNCTIONAL ASSESSMENT: PAIN_FUNCTIONAL_ASSESSMENT: 0-10

## 2025-08-03 ASSESSMENT — PAIN DESCRIPTION - DESCRIPTORS: DESCRIPTORS: PRESSURE

## 2025-08-03 ASSESSMENT — PAIN DESCRIPTION - PAIN TYPE: TYPE: ACUTE PAIN

## 2025-08-03 ASSESSMENT — PAIN SCALES - GENERAL: PAINLEVEL_OUTOF10: 1

## 2025-08-23 ENCOUNTER — HOSPITAL ENCOUNTER (EMERGENCY)
Age: 75
Discharge: HOME | End: 2025-08-23
Payer: MEDICARE

## 2025-08-23 VITALS
RESPIRATION RATE: 19 BRPM | HEART RATE: 72 BPM | BODY MASS INDEX: 27.68 KG/M2 | TEMPERATURE: 97.9 F | HEIGHT: 62 IN | WEIGHT: 150.4 LBS | OXYGEN SATURATION: 98 % | DIASTOLIC BLOOD PRESSURE: 79 MMHG | SYSTOLIC BLOOD PRESSURE: 187 MMHG

## 2025-08-23 DIAGNOSIS — R30.0 DYSURIA: Primary | ICD-10-CM

## 2025-08-23 LAB
BACTERIA URNS QL MICRO: NEGATIVE /HPF
BILIRUB UR QL STRIP: NEGATIVE
CLARITY UR: CLEAR
COLOR UR: YELLOW
EPI CELLS #/AREA URNS AUTO: ABNORMAL /HPF (ref 0–5)
GLUCOSE UR STRIP-MCNC: NEGATIVE MG/DL
HGB UR QL STRIP: NEGATIVE
HYALINE CASTS #/AREA URNS AUTO: ABNORMAL /HPF (ref 0–5)
KETONES UR STRIP-MCNC: NEGATIVE MG/DL
LEUKOCYTE ESTERASE UR QL STRIP: ABNORMAL
NITRITE UR QL STRIP: NEGATIVE
PH UR STRIP: 5.5 [PH] (ref 5–9)
PROT UR STRIP-MCNC: NEGATIVE MG/DL
RBC #/AREA URNS AUTO: ABNORMAL /HPF (ref 0–5)
SP GR UR STRIP: 1.01 (ref 1–1.03)
URINE REFLEX TO CULTURE: YES
UROBILINOGEN UR STRIP-ACNC: 0.2 E.U./DL
WBC #/AREA URNS AUTO: ABNORMAL /HPF (ref 0–5)

## 2025-08-23 PROCEDURE — 99283 EMERGENCY DEPT VISIT LOW MDM: CPT

## 2025-08-23 PROCEDURE — 87086 URINE CULTURE/COLONY COUNT: CPT

## 2025-08-23 PROCEDURE — 81001 URINALYSIS AUTO W/SCOPE: CPT

## 2025-08-23 RX ORDER — NITROFURANTOIN 25; 75 MG/1; MG/1
100 CAPSULE ORAL 2 TIMES DAILY
Qty: 10 CAPSULE | Refills: 0 | Status: SHIPPED | OUTPATIENT
Start: 2025-08-23 | End: 2025-08-28

## 2025-08-23 ASSESSMENT — ENCOUNTER SYMPTOMS
DIARRHEA: 0
RESPIRATORY NEGATIVE: 1
VOMITING: 0
ALLERGIC/IMMUNOLOGIC NEGATIVE: 1
ABDOMINAL PAIN: 0
NAUSEA: 0
BACK PAIN: 0
EYES NEGATIVE: 1

## 2025-08-23 ASSESSMENT — PAIN SCALES - GENERAL: PAINLEVEL_OUTOF10: 2

## 2025-08-23 ASSESSMENT — PAIN - FUNCTIONAL ASSESSMENT: PAIN_FUNCTIONAL_ASSESSMENT: 0-10

## 2025-08-25 LAB — BACTERIA UR CULT: NORMAL
